# Patient Record
Sex: MALE | Race: WHITE | NOT HISPANIC OR LATINO | Employment: UNEMPLOYED | URBAN - METROPOLITAN AREA
[De-identification: names, ages, dates, MRNs, and addresses within clinical notes are randomized per-mention and may not be internally consistent; named-entity substitution may affect disease eponyms.]

---

## 2021-11-15 ENCOUNTER — HISTORICAL (OUTPATIENT)
Dept: ADMINISTRATIVE | Facility: HOSPITAL | Age: 41
End: 2021-11-15

## 2021-11-15 LAB
ABS NEUT (OLG): 5.46 X10(3)/MCL (ref 2.1–9.2)
ALBUMIN SERPL-MCNC: 3.3 GM/DL (ref 3.5–5)
ALBUMIN/GLOB SERPL: 0.5 RATIO (ref 1.1–2)
ALP SERPL-CCNC: 91 UNIT/L (ref 40–150)
ALT SERPL-CCNC: 28 UNIT/L (ref 0–55)
AMPHET UR QL SCN: NEGATIVE
APPEARANCE, UA: CLEAR
AST SERPL-CCNC: 63 UNIT/L (ref 5–34)
BACTERIA #/AREA URNS AUTO: ABNORMAL /HPF
BARBITURATE SCN PRESENT UR: NEGATIVE
BASOPHILS # BLD AUTO: 0.1 X10(3)/MCL (ref 0–0.2)
BASOPHILS NFR BLD AUTO: 1 %
BENZODIAZ UR QL SCN: NEGATIVE
BILIRUB SERPL-MCNC: 5.4 MG/DL
BILIRUB UR QL STRIP: NEGATIVE
BILIRUBIN DIRECT+TOT PNL SERPL-MCNC: 1.7 MG/DL (ref 0–0.8)
BILIRUBIN DIRECT+TOT PNL SERPL-MCNC: 3.7 MG/DL (ref 0–0.5)
BUN SERPL-MCNC: 14.9 MG/DL (ref 8.9–20.6)
CALCIUM SERPL-MCNC: 10.9 MG/DL (ref 8.7–10.5)
CANNABINOIDS UR QL SCN: NEGATIVE
CHLORIDE SERPL-SCNC: 101 MMOL/L (ref 98–107)
CO2 SERPL-SCNC: 22 MMOL/L (ref 22–29)
COCAINE UR QL SCN: NEGATIVE
COLOR UR: YELLOW
CREAT SERPL-MCNC: 1.07 MG/DL (ref 0.73–1.18)
EOSINOPHIL # BLD AUTO: 0.1 X10(3)/MCL (ref 0–0.9)
EOSINOPHIL NFR BLD AUTO: 2 %
ERYTHROCYTE [DISTWIDTH] IN BLOOD BY AUTOMATED COUNT: 15.3 % (ref 11.5–14.5)
ETHANOL SERPL-MCNC: <10 MG/DL
FENTANYL UR QL SCN: NEGATIVE
GLOBULIN SER-MCNC: 6.1 GM/DL (ref 2.4–3.5)
GLUCOSE (UA): NEGATIVE
GLUCOSE SERPL-MCNC: 108 MG/DL (ref 74–100)
HCT VFR BLD AUTO: 30.3 % (ref 40–51)
HGB BLD-MCNC: 10.4 GM/DL (ref 13.5–17.5)
HGB UR QL STRIP: NEGATIVE
HYALINE CASTS #/AREA URNS LPF: ABNORMAL /LPF
IMM GRANULOCYTES # BLD AUTO: 0.05 10*3/UL
IMM GRANULOCYTES NFR BLD AUTO: 1 %
INR PPP: 1.3 (ref 0.9–1.2)
KETONES UR QL STRIP: NEGATIVE
LEUKOCYTE ESTERASE UR QL STRIP: 25 LEU/UL
LYMPHOCYTES # BLD AUTO: 1.6 X10(3)/MCL (ref 0.6–4.6)
LYMPHOCYTES NFR BLD AUTO: 19 %
MCH RBC QN AUTO: 36.2 PG (ref 26–34)
MCHC RBC AUTO-ENTMCNC: 34.3 GM/DL (ref 31–37)
MCV RBC AUTO: 105.6 FL (ref 80–100)
MDMA UR QL SCN: NEGATIVE
MONOCYTES # BLD AUTO: 1.2 X10(3)/MCL (ref 0.1–1.3)
MONOCYTES NFR BLD AUTO: 14 %
NEUTROPHILS # BLD AUTO: 5.46 X10(3)/MCL (ref 2.1–9.2)
NEUTROPHILS NFR BLD AUTO: 64 %
NITRITE UR QL STRIP: NEGATIVE
NRBC BLD AUTO-RTO: 0 % (ref 0–0.2)
OPIATES UR QL SCN: NEGATIVE
PCP UR QL: NEGATIVE
PH UR STRIP.AUTO: 5.5 [PH] (ref 3–11)
PH UR STRIP: 5.5 [PH] (ref 4.5–8)
PLATELET # BLD AUTO: 199 X10(3)/MCL (ref 130–400)
PMV BLD AUTO: 10.6 FL (ref 7.4–10.4)
POTASSIUM SERPL-SCNC: 5.1 MMOL/L (ref 3.5–5.1)
PROT SERPL-MCNC: 9.4 GM/DL (ref 6.4–8.3)
PROT UR QL STRIP: NEGATIVE
PROTHROMBIN TIME: 16 SECOND(S) (ref 11.9–14.4)
RBC # BLD AUTO: 2.87 X10(6)/MCL (ref 4.5–5.9)
RBC #/AREA URNS AUTO: ABNORMAL /HPF
SODIUM SERPL-SCNC: 133 MMOL/L (ref 136–145)
SP GR UR STRIP: 1.01 (ref 1–1.03)
SQUAMOUS #/AREA URNS LPF: ABNORMAL /LPF
UROBILINOGEN UR STRIP-ACNC: NORMAL
WBC # SPEC AUTO: 8.5 X10(3)/MCL (ref 4.5–11)
WBC #/AREA URNS AUTO: ABNORMAL /HPF

## 2021-11-17 LAB — FINAL CULTURE: NO GROWTH

## 2022-03-28 ENCOUNTER — HISTORICAL (OUTPATIENT)
Dept: ENDOSCOPY | Facility: HOSPITAL | Age: 42
End: 2022-03-28

## 2022-04-10 ENCOUNTER — HISTORICAL (OUTPATIENT)
Dept: ADMINISTRATIVE | Facility: HOSPITAL | Age: 42
End: 2022-04-10
Payer: MEDICAID

## 2022-04-30 VITALS
DIASTOLIC BLOOD PRESSURE: 56 MMHG | SYSTOLIC BLOOD PRESSURE: 132 MMHG | WEIGHT: 156.75 LBS | BODY MASS INDEX: 22.44 KG/M2 | SYSTOLIC BLOOD PRESSURE: 145 MMHG | WEIGHT: 137.13 LBS | HEIGHT: 70 IN | BODY MASS INDEX: 19.63 KG/M2 | OXYGEN SATURATION: 99 % | DIASTOLIC BLOOD PRESSURE: 87 MMHG | HEIGHT: 70 IN

## 2022-05-14 NOTE — H&P
History of Present Illness  Patient presents today for screening EGD. He has been abstaining from alcohol. Denies chest pain, hematemesis, weight loss, dysphagia.  Review of Systems  Negative except as above  Physical Exam  Gen: well appearing, NAD  HEENT: normocephalic, atraumatic  CV: RRR  Pulm: no increased work of breathing, equal chest rise  Abd: soft, NT/ND  MSK: no diffuse rash, no injury or deformity  Neuro: no focal deficits, normal gait  Assessment/Plan  ?Dex Ma?is a 42 yo with?portal hypertension/decompensated liver disease dx secondary to alcohol abuse?10/2021 (original Meld 29, CPC-C, SAG 1.5, total protein <0.8 indicative of portal hypertension)? presenting today for screening EGD  ?  - risks and benefits of procedure discussed and consent obtained  - follow up pending findings  ?   Annabella Esteban PGY2   Problem List/Past Medical History  Ongoing  Alcoholic cirrhosis of liver with ascites  Historical  No qualifying data  Procedure/Surgical History  Drainage of Peritoneal Cavity, Percutaneous Approach (10/22/2021)  jaw surgery  tonsils and adenoids   Medications  Inpatient  No active inpatient medications  Home  folic acid 1 mg oral tablet, See Instructions, 3 refills  furosemide 20 mg oral tablet, See Instructions, 3 refills  lactulose 10 g/15 mL oral syrup, See Instructions  midodrine 10 mg oral tablet, 10 mg= 1 tab(s), Oral, TID, 2 refills  Protonix 40 mg ORAL enteric coated tablet, 40 mg= 1 tab(s), Oral, Daily, 3 refills  Rolling Walker, See Instructions,? ?Not taking: PT. STATES  spironolactone 25 mg oral tablet, See Instructions, 3 refills  Vitamin B1 100 mg oral tablet, See Instructions, 2 refills  Allergies  No Known Allergies  No Known Medication Allergies  Social History  Abuse/Neglect  No, No, Yes, 03/07/2022  Alcohol  Past, Liquor, 03/07/2022  Employment/School  Employed, 11/15/2021  Exercise  Exercise duration: 15. Exercise frequency: 1-2 times/week. Exercise type: EXERCISE  BIKE., 03/07/2022  Financial/Legal Situation  None, 03/07/2022  Home/Environment  Lives with Mother. Living situation: Home/Independent., 11/15/2021    Never in , 03/07/2022  Nutrition/Health  Low sodium, Good, 03/07/2022  Sexual  Gender Identity Identifies as male., 03/07/2022  Spiritual/Cultural  Orthodox, 11/15/2021  Substance Use  Never, 11/15/2021  Tobacco  5-9 cigarettes (between 1/4 to 1/2 pack)/day in last 30 days, Cigarettes, No, 03/07/2022     [1]?[facility] IM Office Visit Note; Lionel Reno MD 03/07/2022 10:18 CST

## 2022-08-03 ENCOUNTER — OFFICE VISIT (OUTPATIENT)
Dept: URGENT CARE | Facility: CLINIC | Age: 42
End: 2022-08-03
Payer: MEDICAID

## 2022-08-03 VITALS
TEMPERATURE: 99 F | DIASTOLIC BLOOD PRESSURE: 105 MMHG | RESPIRATION RATE: 20 BRPM | WEIGHT: 164.44 LBS | BODY MASS INDEX: 24.36 KG/M2 | HEART RATE: 74 BPM | OXYGEN SATURATION: 99 % | HEIGHT: 69 IN | SYSTOLIC BLOOD PRESSURE: 162 MMHG

## 2022-08-03 DIAGNOSIS — I10 HYPERTENSION, UNSPECIFIED TYPE: ICD-10-CM

## 2022-08-03 DIAGNOSIS — R10.9 ABDOMINAL PAIN, UNSPECIFIED ABDOMINAL LOCATION: Primary | ICD-10-CM

## 2022-08-03 LAB
ALBUMIN SERPL-MCNC: 4.2 GM/DL (ref 3.5–5)
ALBUMIN/GLOB SERPL: 1.1 RATIO (ref 1.1–2)
ALP SERPL-CCNC: 82 UNIT/L (ref 40–150)
ALT SERPL-CCNC: 16 UNIT/L (ref 0–55)
AMYLASE SERPL-CCNC: 81 UNIT/L (ref 25–125)
AST SERPL-CCNC: 20 UNIT/L (ref 5–34)
BASOPHILS # BLD AUTO: 0.03 X10(3)/MCL (ref 0–0.2)
BASOPHILS NFR BLD AUTO: 0.5 %
BILIRUB UR QL STRIP: NEGATIVE
BILIRUBIN DIRECT+TOT PNL SERPL-MCNC: 1.2 MG/DL
BUN SERPL-MCNC: 14.5 MG/DL (ref 8.9–20.6)
CALCIUM SERPL-MCNC: 10 MG/DL (ref 8.4–10.2)
CHLORIDE SERPL-SCNC: 100 MMOL/L (ref 98–107)
CO2 SERPL-SCNC: 30 MMOL/L (ref 22–29)
CREAT SERPL-MCNC: 0.92 MG/DL (ref 0.73–1.18)
EOSINOPHIL # BLD AUTO: 0.17 X10(3)/MCL (ref 0–0.9)
EOSINOPHIL NFR BLD AUTO: 2.6 %
ERYTHROCYTE [DISTWIDTH] IN BLOOD BY AUTOMATED COUNT: 12.3 % (ref 11.5–17)
GLOBULIN SER-MCNC: 3.9 GM/DL (ref 2.4–3.5)
GLUCOSE SERPL-MCNC: 85 MG/DL (ref 74–100)
GLUCOSE UR QL STRIP: NEGATIVE
HAV AB SER QL IA: NONREACTIVE
HAV IGM SERPL QL IA: NONREACTIVE
HBV SURFACE AG SERPL QL IA: NONREACTIVE
HCT VFR BLD AUTO: 46.7 % (ref 42–52)
HCV AB SERPL QL IA: NONREACTIVE
HGB BLD-MCNC: 16.5 GM/DL (ref 14–18)
IMM GRANULOCYTES # BLD AUTO: 0.01 X10(3)/MCL (ref 0–0.04)
IMM GRANULOCYTES NFR BLD AUTO: 0.2 %
KETONES UR QL STRIP: POSITIVE
LEUKOCYTE ESTERASE UR QL STRIP: NEGATIVE
LIPASE SERPL-CCNC: 24 U/L
LYMPHOCYTES # BLD AUTO: 1.86 X10(3)/MCL (ref 0.6–4.6)
LYMPHOCYTES NFR BLD AUTO: 28.4 %
MCH RBC QN AUTO: 32.8 PG (ref 27–31)
MCHC RBC AUTO-ENTMCNC: 35.3 MG/DL (ref 33–36)
MCV RBC AUTO: 92.8 FL (ref 80–94)
MONOCYTES # BLD AUTO: 0.44 X10(3)/MCL (ref 0.1–1.3)
MONOCYTES NFR BLD AUTO: 6.7 %
NEUTROPHILS # BLD AUTO: 4.1 X10(3)/MCL (ref 2.1–9.2)
NEUTROPHILS NFR BLD AUTO: 61.6 %
NRBC BLD AUTO-RTO: 0 %
PH, POC UA: 6
PLATELET # BLD AUTO: 156 X10(3)/MCL (ref 130–400)
PMV BLD AUTO: 11.7 FL (ref 7.4–10.4)
POC BLOOD, URINE: NEGATIVE
POC NITRATES, URINE: NEGATIVE
POTASSIUM SERPL-SCNC: 3.9 MMOL/L (ref 3.5–5.1)
PROT SERPL-MCNC: 8.1 GM/DL (ref 6.4–8.3)
PROT UR QL STRIP: NEGATIVE
RBC # BLD AUTO: 5.03 X10(6)/MCL (ref 4.7–6.1)
SODIUM SERPL-SCNC: 137 MMOL/L (ref 136–145)
SP GR UR STRIP: 1.02 (ref 1–1.03)
UROBILINOGEN UR STRIP-ACNC: 1 (ref 0.3–2.2)
WBC # SPEC AUTO: 6.6 X10(3)/MCL (ref 4.5–11.5)

## 2022-08-03 PROCEDURE — 86803 HEPATITIS C AB TEST: CPT | Performed by: NURSE PRACTITIONER

## 2022-08-03 PROCEDURE — 86709 HEPATITIS A IGM ANTIBODY: CPT | Performed by: NURSE PRACTITIONER

## 2022-08-03 PROCEDURE — 36415 COLL VENOUS BLD VENIPUNCTURE: CPT | Performed by: NURSE PRACTITIONER

## 2022-08-03 PROCEDURE — 81003 URINALYSIS AUTO W/O SCOPE: CPT | Mod: PBBFAC | Performed by: NURSE PRACTITIONER

## 2022-08-03 PROCEDURE — 99214 PR OFFICE/OUTPT VISIT, EST, LEVL IV, 30-39 MIN: ICD-10-PCS | Mod: S$PBB,,, | Performed by: NURSE PRACTITIONER

## 2022-08-03 PROCEDURE — 82150 ASSAY OF AMYLASE: CPT | Performed by: NURSE PRACTITIONER

## 2022-08-03 PROCEDURE — 99214 OFFICE O/P EST MOD 30 MIN: CPT | Mod: S$PBB,,, | Performed by: NURSE PRACTITIONER

## 2022-08-03 PROCEDURE — 87340 HEPATITIS B SURFACE AG IA: CPT | Performed by: NURSE PRACTITIONER

## 2022-08-03 PROCEDURE — 85025 COMPLETE CBC W/AUTO DIFF WBC: CPT | Performed by: NURSE PRACTITIONER

## 2022-08-03 PROCEDURE — 80053 COMPREHEN METABOLIC PANEL: CPT | Performed by: NURSE PRACTITIONER

## 2022-08-03 PROCEDURE — 99215 OFFICE O/P EST HI 40 MIN: CPT | Mod: PBBFAC | Performed by: NURSE PRACTITIONER

## 2022-08-03 PROCEDURE — 83690 ASSAY OF LIPASE: CPT | Performed by: NURSE PRACTITIONER

## 2022-08-03 PROCEDURE — 86708 HEPATITIS A ANTIBODY: CPT | Performed by: NURSE PRACTITIONER

## 2022-08-03 RX ORDER — LACTULOSE 10 G/15ML
10 SOLUTION ORAL; RECTAL
COMMUNITY
Start: 2022-02-02 | End: 2022-08-15 | Stop reason: SDUPTHER

## 2022-08-03 RX ORDER — FUROSEMIDE 20 MG/1
TABLET ORAL
COMMUNITY
Start: 2022-02-02

## 2022-08-03 RX ORDER — SPIRONOLACTONE 25 MG/1
TABLET ORAL
COMMUNITY
Start: 2022-01-24

## 2022-08-03 RX ORDER — FOLIC ACID 1 MG/1
TABLET ORAL
COMMUNITY
Start: 2022-02-02 | End: 2022-08-15 | Stop reason: SDUPTHER

## 2022-08-03 RX ORDER — PANTOPRAZOLE SODIUM 40 MG/1
40 TABLET, DELAYED RELEASE ORAL
COMMUNITY
Start: 2022-01-21

## 2022-08-03 RX ORDER — THIAMINE HCL 100 MG
100 TABLET ORAL DAILY
COMMUNITY
Start: 2022-05-31 | End: 2022-08-15 | Stop reason: SDUPTHER

## 2022-08-03 NOTE — PROGRESS NOTES
"Subjective:       Patient ID: Dex Ma is a 42 y.o. male.    Vitals:  height is 5' 8.9" (1.75 m) and weight is 74.6 kg (164 lb 7.4 oz). His temperature is 98.8 °F (37.1 °C). His blood pressure is 162/105 (abnormal) and his pulse is 74. His respiration is 20 and oxygen saturation is 99%.     Chief Complaint: Abdominal Pain (Rt lower x 4days)    HPI hx alcoholism. Was hospitalized in October of 2021 due to alcohol abuse. Is seeing Dr. AASHISH Tripathi for GI and also Urology Clinic. Has not been diagnosed with either hepatitis, cirrhosis, liver cancer, etc, is awaiting work up. States since being out of the hospital has had no abdominal pain. Now, for 4 days, has been having right middle quadrant pain. No other symptoms. No change in stool. No fevers. No jaundice. No vomiting. Has not consumed alcohol, is avoiding tylenol products. Had been taken off his diurectics, then put back on, so relates increased urination to that.     ROS    Objective:      Physical Exam   Constitutional: He is oriented to person, place, and time. normal  HENT:   Nose: No rhinorrhea or congestion.   Eyes: Conjunctivae are normal. Pupils are equal, round, and reactive to light. Extraocular movement intact   Pulmonary/Chest: Effort normal and breath sounds normal.   Abdominal: Normal appearance and bowel sounds are normal. He exhibits no distension, no fluid wave and no mass. Soft. flat abdomen There is hepatomegaly. There is no splenomegaly. There is abdominal tenderness. There is no rebound, no guarding, no tenderness at McBurney's point, negative Marshall's sign and negative Rovsing's sign. No hernia.   Musculoskeletal: Normal range of motion.         General: Normal range of motion.   Neurological: He is alert and oriented to person, place, and time.   Skin: Skin is warm and dry.   Psychiatric: His behavior is normal. Mood, judgment and thought content normal.   Vitals reviewed.        Assessment:       1. Abdominal pain, unspecified " abdominal location    2. Hypertension, unspecified type        Results for orders placed or performed in visit on 08/03/22   POCT Urinalysis, Dipstick, Automated, W/O Scope   Result Value Ref Range    POC Blood, Urine Negative Negative    POC Bilirubin, Urine Negative Negative    POC Urobilinogen, Urine 1.0 0.3 - 2.2    POC Ketones, Urine Positive (A) Negative    POC Protein, Urine Negative Negative    POC Nitrates, Urine Negative Negative    POC Glucose, Urine Negative Negative    pH, UA 6.0     POC Specific Gravity, Urine 1.020 1.003 - 1.029    POC Leukocytes, Urine Negative Negative       Plan:         Abdominal pain, unspecified abdominal location  -     POCT Urinalysis, Dipstick, Automated, W/O Scope  -     CBC Auto Differential  -     Comprehensive Metabolic Panel  -     Amylase  -     Lipase  -     Hepatitis B Surface Antigen  -     Hepatitis A Antibody, IgM  -     Hepatitis A antibody, IgG  -     Hepatitis C Antibody    Hypertension, unspecified type  -     Nursing communication        Please see patient education for more information.  Please check your [facility] patient portal nery for your blood test results.  If anything is Emergent/Urgent - we will notify you today.  We will call and/or message you within the portal nery, with comments and plans of care if necessary.    Go to the emergency room if you experience severe pain, yellow skin or eyes, vomiting, fevers, blood in stool, confusion.

## 2022-08-03 NOTE — PATIENT INSTRUCTIONS
Please see patient education for more information.  Please check your [facility] patient portal nery for your blood test results.  If anything is Emergent/Urgent - we will notify you today.  We will call and/or message you within the portal nery, with comments and plans of care if necessary.    Go to the emergency room if you experience severe pain, yellow skin or eyes, vomiting, fevers, blood in stool, confusion.

## 2022-08-15 ENCOUNTER — OFFICE VISIT (OUTPATIENT)
Dept: GASTROENTEROLOGY | Facility: CLINIC | Age: 42
End: 2022-08-15
Payer: MEDICAID

## 2022-08-15 VITALS
WEIGHT: 164.38 LBS | SYSTOLIC BLOOD PRESSURE: 128 MMHG | TEMPERATURE: 99 F | HEART RATE: 74 BPM | HEIGHT: 69 IN | RESPIRATION RATE: 20 BRPM | DIASTOLIC BLOOD PRESSURE: 85 MMHG | BODY MASS INDEX: 24.35 KG/M2

## 2022-08-15 DIAGNOSIS — K70.30 ALCOHOLIC CIRRHOSIS OF LIVER WITHOUT ASCITES: Primary | ICD-10-CM

## 2022-08-15 PROCEDURE — 99213 OFFICE O/P EST LOW 20 MIN: CPT | Mod: PBBFAC | Performed by: STUDENT IN AN ORGANIZED HEALTH CARE EDUCATION/TRAINING PROGRAM

## 2022-08-15 RX ORDER — THIAMINE HCL 100 MG
100 TABLET ORAL DAILY
Qty: 90 TABLET | Refills: 3 | Status: SHIPPED | OUTPATIENT
Start: 2022-08-15 | End: 2022-09-21 | Stop reason: SDUPTHER

## 2022-08-15 RX ORDER — LACTULOSE 10 G/15ML
10 SOLUTION ORAL; RECTAL 2 TIMES DAILY
Qty: 900 ML | Refills: 8 | Status: SHIPPED | OUTPATIENT
Start: 2022-08-15 | End: 2023-05-06

## 2022-08-15 RX ORDER — FOLIC ACID 1 MG/1
TABLET ORAL
Qty: 90 TABLET | Refills: 3 | Status: SHIPPED | OUTPATIENT
Start: 2022-08-15 | End: 2023-03-09 | Stop reason: SDUPTHER

## 2022-08-15 NOTE — PROGRESS NOTES
Subjective:       Patient ID: Dex Ma is a 42 y.o. male.    Chief Complaint: Cirrhosis    42-year-old male history of alcoholic cirrhosis, previously hospitalized here at our facility 2021, presenting to clinic today for his follow-up appointment.  He was hospitalized in October 2021 with ascites and decompensated cirrhotic liver disease.  Had presented with weakness and abdominal distension have been going on for weeks prior to his admission with poor p.o. intake and decreased appetite.  Previously used to drink 2 pt of liquor a day up until a few months prior to this prior admission when he had decreased his intake.  At prior to his admission was drinking half a pt to 1 pt of liquor a day.  Smokes cigarettes.  Prior cocaine and marijuana use.  But has not used drugs in if years.      Gastroenterology evaluated patient at that time as MELD was 29, Child Vu class C.  Required a paracentesis on that admission that showed SAAG 1.5, total protein less than 0.8 suggesting  portal hypertension.  Of hyponatremic on admission and diuretics were held at that time and required albumin after his paracentesis.  He was placed on Bactrim once daily for SBP prophylaxis.  EGD was not done inpatient.      Patient had been seen by the residents outpatient after discharge his MELD was already improving total bilirubin had decreased from 18 on the hospital admission down to 5.4.  MELD and a clinic visit calculated to be 21 and Child Vu score was down to be.  Had remained absent from alcohol that time.  Reduced swelling distension along with decreased swelling in lower extremities.      Patient seen today, his recent MELD is 5, Child Vu class a of labs done in March 2022.  His EGD was also performed in March 2022 that showed a normal esophagus, stomach and duodenum.  He still needs an updated abdominal ultrasound.  His last imaging performed was a CT of the abdomen done October 2021 that showed stigmata of cirrhosis  with portal hypertension diffuse body wall edema and bilateral pleural effusions as well.  He needs refills on his folate, B12, lactulose.    He denies any melena, hematochezia, hematemesis.  Denies any jaundice or abdominal swelling or lower extremity swelling.    Review of Systems   Constitutional: Negative.    HENT: Negative.    Eyes: Negative.    Respiratory: Negative.    Cardiovascular: Negative.    Gastrointestinal: Positive for abdominal pain.   Endocrine: Negative.    Musculoskeletal: Negative.    Integumentary:  Negative.   Allergic/Immunologic: Negative.    Neurological: Negative.    Hematological: Negative.    Psychiatric/Behavioral: Negative.          Objective:  Vitals:    08/15/22 1522   BP: 128/85   Pulse: 74   Resp: 20   Temp: 98.5 °F (36.9 °C)           Physical Exam  Constitutional:       Appearance: He is normal weight.   HENT:      Head: Normocephalic and atraumatic.      Mouth/Throat:      Mouth: Mucous membranes are moist.      Pharynx: Oropharynx is clear.   Eyes:      Extraocular Movements: Extraocular movements intact.      Conjunctiva/sclera: Conjunctivae normal.      Pupils: Pupils are equal, round, and reactive to light.   Cardiovascular:      Rate and Rhythm: Normal rate and regular rhythm.      Pulses: Normal pulses.      Heart sounds: Normal heart sounds.   Pulmonary:      Effort: Pulmonary effort is normal.      Breath sounds: Normal breath sounds.   Abdominal:      General: Abdomen is flat. Bowel sounds are normal.      Palpations: Abdomen is soft.   Musculoskeletal:         General: Normal range of motion.   Skin:     General: Skin is warm and dry.   Neurological:      General: No focal deficit present.      Mental Status: He is alert and oriented to person, place, and time. Mental status is at baseline.   Psychiatric:         Mood and Affect: Mood normal.         Thought Content: Thought content normal.         Judgment: Judgment normal.         Assessment:       Problem List Items  Addressed This Visit    None     Visit Diagnoses     Alcoholic cirrhosis of liver without ascites    -  Primary    Relevant Medications    folic acid (FOLVITE) 1 MG tablet    Other Relevant Orders    US Abdomen Limited_Liver          Plan:         Background:  Alcohol:  Yes, the past    Tobacco:  Cigarettes    Liver disease:  Alcohol    MELD-Na:  5  Child-Vu Class: A    Transplant: not under evaluation, low MELD    Cirrhosis is decompensated with:    Ascites:  In the past yes, in his October 2021 admission went been on Lasix 40 mg Aldactone 100 mg previously; paracentesis performed October 2021 revealed SANDRO G1 0.5, total protein less than 0.8, suggestive of portal hypertension.  Spontaneous bacterial peritonitis: No  Hepatic Encephalopathy: No  Hepatocellular carcinoma: No  Hepatorenal syndrome: No  Hyponatremia: No  Muscle wasting: No  Portal vein thrombosis: No    Screening:  Last EGD:  March 2022:  Normal esophagus, stomach, duodenum.      Last imaging study:  Abd CT:  October 2021:  Showed stigmata of cirrhosis with portal hypertension diffuse body wall edema and bilateral pleural effusions as well.    CIRRHOSIS COUNSELING:  - strict abstinence of alcohol use  - low sodium (salt) 2000mg per day  - Nutrition: 25-30kcal (calorie per body weight in kilogram) per day  - No need to restrict protein in diet  - High protein diet: 1.2-1.5 gram/kg (protein per body weight in kg) per day to prevent muscle mass loss  - Resistance exercises for muscle strength  - Avoid raw seafoods due to risk of fatal Vibrio vulnificus infection  - Avoid Non-steroidal anti-inflammatory drugs (NSAIDs) such as ibuprofen, Motrin, naproxen, Aleve due to risk of kidney damage  - Can take acetaminophen (tylenol), no more than 2000mg per day  - Compliance with all medications  - Ultrasound or MRI of the liver every 6 months for liver cancer screening  - Upper endoscopy every 1-2 years to screen for varices      Follow-up in 6 months, will  repeat abdominal ultrasound for HCC screening at this time.    Praised abstinence from alcohol.  Next EGD to be done in 2024.  He will need a colonoscopy when he is 44 years old.

## 2022-08-23 ENCOUNTER — HOSPITAL ENCOUNTER (OUTPATIENT)
Dept: RADIOLOGY | Facility: HOSPITAL | Age: 42
Discharge: HOME | End: 2022-08-23
Attending: STUDENT IN AN ORGANIZED HEALTH CARE EDUCATION/TRAINING PROGRAM
Payer: MEDICAID

## 2022-08-23 DIAGNOSIS — K70.30 ALCOHOLIC CIRRHOSIS OF LIVER WITHOUT ASCITES: ICD-10-CM

## 2022-08-23 PROCEDURE — 76705 ECHO EXAM OF ABDOMEN: CPT | Mod: TC

## 2022-09-21 ENCOUNTER — OFFICE VISIT (OUTPATIENT)
Dept: INTERNAL MEDICINE | Facility: CLINIC | Age: 42
End: 2022-09-21
Payer: MEDICAID

## 2022-09-21 VITALS
BODY MASS INDEX: 24.41 KG/M2 | DIASTOLIC BLOOD PRESSURE: 87 MMHG | RESPIRATION RATE: 20 BRPM | OXYGEN SATURATION: 98 % | HEART RATE: 97 BPM | HEIGHT: 69 IN | TEMPERATURE: 98 F | SYSTOLIC BLOOD PRESSURE: 133 MMHG | WEIGHT: 164.81 LBS

## 2022-09-21 DIAGNOSIS — F10.21 HISTORY OF ALCOHOLISM: Primary | ICD-10-CM

## 2022-09-21 PROCEDURE — 99214 OFFICE O/P EST MOD 30 MIN: CPT | Mod: PBBFAC

## 2022-09-21 RX ORDER — THIAMINE HCL 100 MG
100 TABLET ORAL DAILY
Qty: 90 TABLET | Refills: 3 | Status: SHIPPED | OUTPATIENT
Start: 2022-09-21 | End: 2023-03-09 | Stop reason: SDUPTHER

## 2022-09-21 NOTE — PROGRESS NOTES
[facility]    CC: Routine Follow-up    HPI   Mr. Dex Ma is a 42-year-old  male with a past medical history of a alcohol use disorder, portal hypertension and decompensated liver disease presenting for routine follow-up.  Continues to abstain from alcohol since discharge from hospital in 2021.  Reports good appetite, titrating lactulose to 2-3 bowel movements per day.  Compliant min thiamine, folic acid.  Recently saw Dr. Hernandez and cirrhosis clinic 2 months ago underwent right upper quadrant ultrasound 08/23/2022.  Discussed results, no significant changes from prior, no new focal lesions.  Otherwise reports he is doing well, continues to live with parents.  He works at a [employer] facility and was requesting Adderall for help with concentration.  Reports easy distractibility, unable to focus, reports this has been throughout his lifetime.  At some point on college he used Adderall to help him focus and has not really used since then.  Denies being diagnosed in childhood or being on Adderall prescriptions.  Discuss referral to [facility] psychiatrist for further evaluation and potentially starting Wellbutrin as an option.    Review of Systems  14 pt ROS negative unless mentioned above    Physical Examination  /69 (BP Location: Left arm, Patient Position: Sitting)   Pulse (!) 54   Temp 97.9 °F (36.6 °C) (Oral)   Resp 18   Ht 6' (1.829 m)   Wt 85.7 kg (189 lb)   SpO2 99%   BMI 25.63 kg/m²   General appearance: alert, cooperative, no distress  HENT: Normocephalic, atraumatic, neck symmetrical, no nasal discharge, No scleral icterus  Lungs: clear to auscultation bilaterally, no dullness to percussion bilaterally  Heart: regular rate and rhythm without rub; no displacement of the PMI   Abdomen: soft, non-tender; bowel sounds normoactive; no organomegaly  Extremities: extremities symmetric; no clubbing, cyanosis, or edema  Neurologic: Alert and oriented X 3, normal strength,  normal coordination and gait  Psych: slightly fast pace of speaking, comprehensible, no flight of idea, does seem slightly easily distractible    Assessment/Plan:    Hx of decompensated cirrhotic liver disease 2/2 chronic alcohol use  -d at hospital admission 10/2021 (original Meld 29, CPC-C, SAG 1.5, total protein <0.8 indicative of portal hypertension )  -currently abstaining  -Compliant with cirrhosis clinic and GI f/u. Next appt Cirrhosis clinic 02/2023  -RUQ q6m ( 8/23/2022 without new focal lesions, similar compared to prior heterogenous liver with hep year atosteatosis without ascites), Hep C neg  -EGD by Dr. Lomas 03/28/2022, will need to look at official report in [facility] ( normal esophagus, stomach, duodenum per SHANTE Farr Note 08/15/2022)    Decreased concentration  -Referred to Dr. Blanco to evaluate for ADHD    Current Tobacco User  -10 cigs/day, advised cessation and and offered referral to cessation counseling not ready yet and and blood    HM  -refused Flu Vaccine/Tetanus ( no specific reason given)    F/U in 6 months, with labs ( CBC, CMP, INR, EtOH level)    Lionel Reno MD  PGY-3, Internal Medicine Resident

## 2022-11-09 ENCOUNTER — OFFICE VISIT (OUTPATIENT)
Dept: BEHAVIORAL HEALTH | Facility: CLINIC | Age: 42
End: 2022-11-09
Payer: MEDICAID

## 2022-11-09 VITALS
TEMPERATURE: 98 F | SYSTOLIC BLOOD PRESSURE: 145 MMHG | WEIGHT: 163.31 LBS | OXYGEN SATURATION: 99 % | BODY MASS INDEX: 24.12 KG/M2 | HEART RATE: 64 BPM | DIASTOLIC BLOOD PRESSURE: 93 MMHG

## 2022-11-09 DIAGNOSIS — F10.21 ALCOHOL USE DISORDER, MODERATE, IN SUSTAINED REMISSION: ICD-10-CM

## 2022-11-09 DIAGNOSIS — F90.0 ADHD (ATTENTION DEFICIT HYPERACTIVITY DISORDER), INATTENTIVE TYPE: Primary | ICD-10-CM

## 2022-11-09 LAB
T4 FREE SERPL-MCNC: 0.88 NG/DL (ref 0.7–1.48)
TSH SERPL-ACNC: 1.94 UIU/ML (ref 0.35–4.94)

## 2022-11-09 PROCEDURE — 3077F SYST BP >= 140 MM HG: CPT | Mod: CPTII,AF,HB, | Performed by: STUDENT IN AN ORGANIZED HEALTH CARE EDUCATION/TRAINING PROGRAM

## 2022-11-09 PROCEDURE — 3080F DIAST BP >= 90 MM HG: CPT | Mod: CPTII,AF,HB, | Performed by: STUDENT IN AN ORGANIZED HEALTH CARE EDUCATION/TRAINING PROGRAM

## 2022-11-09 PROCEDURE — 1159F MED LIST DOCD IN RCRD: CPT | Mod: CPTII,AF,HB, | Performed by: STUDENT IN AN ORGANIZED HEALTH CARE EDUCATION/TRAINING PROGRAM

## 2022-11-09 PROCEDURE — 3008F BODY MASS INDEX DOCD: CPT | Mod: CPTII,AF,HB, | Performed by: STUDENT IN AN ORGANIZED HEALTH CARE EDUCATION/TRAINING PROGRAM

## 2022-11-09 PROCEDURE — 3008F PR BODY MASS INDEX (BMI) DOCUMENTED: ICD-10-PCS | Mod: CPTII,AF,HB, | Performed by: STUDENT IN AN ORGANIZED HEALTH CARE EDUCATION/TRAINING PROGRAM

## 2022-11-09 PROCEDURE — 99204 OFFICE O/P NEW MOD 45 MIN: CPT | Mod: S$PBB,AF,HB, | Performed by: STUDENT IN AN ORGANIZED HEALTH CARE EDUCATION/TRAINING PROGRAM

## 2022-11-09 PROCEDURE — 3080F PR MOST RECENT DIASTOLIC BLOOD PRESSURE >= 90 MM HG: ICD-10-PCS | Mod: CPTII,AF,HB, | Performed by: STUDENT IN AN ORGANIZED HEALTH CARE EDUCATION/TRAINING PROGRAM

## 2022-11-09 PROCEDURE — 84443 ASSAY THYROID STIM HORMONE: CPT | Performed by: STUDENT IN AN ORGANIZED HEALTH CARE EDUCATION/TRAINING PROGRAM

## 2022-11-09 PROCEDURE — 84439 ASSAY OF FREE THYROXINE: CPT | Performed by: STUDENT IN AN ORGANIZED HEALTH CARE EDUCATION/TRAINING PROGRAM

## 2022-11-09 PROCEDURE — 1160F PR REVIEW ALL MEDS BY PRESCRIBER/CLIN PHARMACIST DOCUMENTED: ICD-10-PCS | Mod: CPTII,AF,HB, | Performed by: STUDENT IN AN ORGANIZED HEALTH CARE EDUCATION/TRAINING PROGRAM

## 2022-11-09 PROCEDURE — 3077F PR MOST RECENT SYSTOLIC BLOOD PRESSURE >= 140 MM HG: ICD-10-PCS | Mod: CPTII,AF,HB, | Performed by: STUDENT IN AN ORGANIZED HEALTH CARE EDUCATION/TRAINING PROGRAM

## 2022-11-09 PROCEDURE — 36415 COLL VENOUS BLD VENIPUNCTURE: CPT | Performed by: STUDENT IN AN ORGANIZED HEALTH CARE EDUCATION/TRAINING PROGRAM

## 2022-11-09 PROCEDURE — 1159F PR MEDICATION LIST DOCUMENTED IN MEDICAL RECORD: ICD-10-PCS | Mod: CPTII,AF,HB, | Performed by: STUDENT IN AN ORGANIZED HEALTH CARE EDUCATION/TRAINING PROGRAM

## 2022-11-09 PROCEDURE — 99204 PR OFFICE/OUTPT VISIT, NEW, LEVL IV, 45-59 MIN: ICD-10-PCS | Mod: S$PBB,AF,HB, | Performed by: STUDENT IN AN ORGANIZED HEALTH CARE EDUCATION/TRAINING PROGRAM

## 2022-11-09 PROCEDURE — 99213 OFFICE O/P EST LOW 20 MIN: CPT | Mod: PBBFAC,PN | Performed by: STUDENT IN AN ORGANIZED HEALTH CARE EDUCATION/TRAINING PROGRAM

## 2022-11-09 PROCEDURE — 80307 DRUG TEST PRSMV CHEM ANLYZR: CPT | Performed by: STUDENT IN AN ORGANIZED HEALTH CARE EDUCATION/TRAINING PROGRAM

## 2022-11-09 PROCEDURE — 1160F RVW MEDS BY RX/DR IN RCRD: CPT | Mod: CPTII,AF,HB, | Performed by: STUDENT IN AN ORGANIZED HEALTH CARE EDUCATION/TRAINING PROGRAM

## 2022-11-09 RX ORDER — BUPROPION HYDROCHLORIDE 150 MG/1
150 TABLET ORAL DAILY
Qty: 30 TABLET | Refills: 2 | Status: SHIPPED | OUTPATIENT
Start: 2022-11-09 | End: 2023-11-09

## 2022-11-09 NOTE — PROGRESS NOTES
"Outpatient Psychiatry Initial Visit    11/9/2022    Dex Ma, a 42 y.o. male, presenting for initial evaluation visit. Met with patient.    Reason for Encounter:   Referred from: Lionel Reno MD  Reason for referral: "history of alcoholism"  Chief complaint: "I wanted to get on Adderall"    History of Present Illness:   Pt is a 41yo M w/ PPHx of anxiety  who presents to psychiatry clinic for evaluation.      Pt reports history of mental health treatment as a teenager.  Denies history of psychiatric diagnosis.  Notes he was prescribed paxil (one month), helpful but did not continue to take.  Denies history of ADHD evaluation or diagnosis.  Tried adderall from friends in college, helpful without SE.  Notes difficulty with focusing since childhood.  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  In school, had a hard time remaining seated, remaining focused, studying.  Made "decent grades in school," didn't finish HS, completed GED.  Often in skilled nursing ("out of boredom"), in school suspensions.  Denies expulsions.  Denies fighting in school.  Notes similar problems at home with attention.  Didn't finish higher education program.  Pt voices interest in evaluation now due to upcoming job change.      Regarding depression, pt denies history of depressive episodes.  Denies currently feeling depressed.  Denies history of suicidal thinking or behavior.      Denies history of episodes concerning for evangelist/hypomania.      Denies history of hallucinations or other altered perceptions, denies paranoid ideation.      Endorses history of excess worry/anxiety.  Anxiety much improved with stopping drinking.  Endorses growing up with excessive anxiety.  Denies current difficulty with anxiety.     Denies history of significant traumatic events.   Denies post traumatic symptoms.    Started drinking as a teenager.  Worked in [employer] business for about 20 yrs.  Notes drinking " "became problematic about 10 yrs ago.  Notes "I was drinking too much."  Drinking about 1/5 daily.  Notes withdrawal symptoms when stopping drinking "towards the end."  Notes that he stopped drinking "because me liver was really damaged."  Hospitalized at [facility].  Last drink 10/21/2022.  Denies rehab/IOP.  Does not use 12 step meetings as supports.  Denies cravings for alcohol.  Diagnosed with cirrhosis and portal hypertension.  Currently seeing liver doctor, no current need for liver transplant.      Meds Hx (has pt taken the following):   SSRIs: paxil (helpful, SE of "feeling like a zombie")  SNRIs: denies  TCAs: denies  Atypical ADs: denies  Anxiolytics: denies  Neuroleptics: denies  Mood stabilizers: denies  Stimulants: adderall (non prescribed, helpful, no SE)  Other: denies    History:     Allergies:  Patient has no known allergies.    Past Medical/Surgical History:  Past Medical History:   Diagnosis Date    GERD (gastroesophageal reflux disease)      Past Surgical History:   Procedure Laterality Date    ADENOIDECTOMY      DENTAL SURGERY      TONSILLECTOMY         Medications  Outpatient Encounter Medications as of 11/9/2022   Medication Sig Dispense Refill    folic acid (FOLVITE) 1 MG tablet See Instructions, TAKE 1 TABLET BY MOUTH DAILY, # 30 tab(s), 3 Refill(s), Pharmacy: [id number], 177, cm, Height/Length Dosing, 01/24/22 15:49:00 CST, 71.1, kg, Weight Dosing, 01/24/22 15:49:00 CST 90 tablet 3    lactulose (CHRONULAC) 10 gram/15 mL solution Take 15 mLs (10 g total) by mouth 2 (two) times daily. 900 mL 8    pantoprazole (PROTONIX) 40 MG tablet Take 40 mg by mouth.      VITAMIN B-1 100 MG tablet Take 1 tablet (100 mg total) by mouth once daily. 90 tablet 3    buPROPion (WELLBUTRIN XL) 150 MG TB24 tablet Take 1 tablet (150 mg total) by mouth once daily. 30 tablet 2    furosemide (LASIX) 20 MG tablet   See Instructions, TAKE 2 TABLETS BY MOUTH DAILY, # 60 tab(s), 3 Refill(s), Pharmacy: [facility] " [facility] #[id number], 177, cm, Height/Length Dosing, 01/24/22 15:49:00 CST, 71.1, kg, Weight Dosing, 01/24/22 15:49:00 CST      spironolactone (ALDACTONE) 25 MG tablet   See Instructions, TAKE 4 TABLETS BY MOUTH DAILY, # 120 tab(s), 3 Refill(s), Pharmacy: [facility] #[id number], 177, cm, Height/Length Dosing, 01/24/22 15:49:00 CST, 71.1, kg, Weight Dosing, 01/24/22 15:49:00 CST       No facility-administered encounter medications on file as of 11/9/2022.     Past Psychiatric History:  Previous Medication Trials: See above   Previous Psychiatric Hospitalizations: denies   Previous Suicide Attempts: denies   History of Violence: none in past 6 months  Outpatient mental health: denies  Family History: denies    Social History:  Marital Status: single  Children: 0   Employment Status/Info: working on custom cars  Education: completed GED  Housing Status: house with parents and brother  History of phys/sexual abuse: denies  Access to gun: yes, not stored in secure location, unloaded, ammunition kept with firearm    Substance Abuse History:  Tobacco Use: 0.5ppd, started "late teens," mild interest in quitting  Use of Alcohol: see above  Recreational Drugs: cannabis, 3-4x per week  Rehab/detox: denies    Legal History:  Past Charges/Incarcerations: arrest for disordly conduct   Pending charges: denies     Psychosocial Stressors: health and occupational    Review Of Systems:     Constitutional: denies fevers, denies chills, denies recent weight change  Eyes: denies pain in eyes or loss of vision  Ears: denies tinnitis, denies loss of hearing  Mouth/throat: denies difficulty with speaking, denies difficulty with swallowing  Cardiac: denies CP, denies palpitations  Respiratory: denies SOB, denies cough  Gastrointestinal: denies abdominal pain, denies nausea/vomiting, denies constipation/diarrhea  Genitourinary: denies urinary frequency, denies burning on urination  Dermatologic: denies rash, denies " "erythema  Musculoskeletal: denies myalgias, denies arthralgias  Hematologic: denies easy bleeding/bruising, denies enlarged lymph nodes  Neurologic: denies seizures, denies headaches, denies loss of sensation, denies weakness  Psychiatric: see HPI    Current Evaluation:     Nutritional Screening: Considering the patient's height and weight, medications, medical history and preferences, should a referral be made to the dietitian? no    Constitutional  Vitals:  Most recent vital signs, dated less than 90 days prior to this appointment, were reviewed.      Vitals:    11/09/22 0925   BP: (!) 145/93   Pulse: 64   Temp: 98 °F (36.7 °C)   SpO2: 99%   Weight: 74.1 kg (163 lb 4.8 oz)      General:  No acute distress     Neurologic:   Motor: moves all extremities spontaneously and without difficulty  Gait: normal gait and station    Mental status examination:  Appearance: unremarkable, age appropriate  Level of Consciousness: awake and alert  Behavior/Cooperation: calm and cooperative  Psychomotor: unremarkable  Speech: normal tone, normal rate, normal pitch, normal volume  Language: english, fluid  Orientation: grossly intact, person, place, situation, day of week, month of year, year  Attention Span/Concentration: intact to interview and spells "WORLD" forwards and backwards without error  Memory: Registers 3/3 objects, recalls 3/3 objects at 5 minutes without cuing  Mood: "calm"  Affect: mood congruent and euthymic  Thought Process: linear, goal-directed  Associations: Logical and appropriate  Thought Content: denies SI/HI/paranoia, no delusional ideation volunteered, denies plan or desire for self harm or harm to others  Fund of Knowledge: appropriate for education  Abstraction: proverbs were abstract and similarities were abstract  Insight: good  Judgment: good    Relevant Elements of Neurological Exam: no abnormal involuntary movements observed    Functioning in Relationships:  Spouse/partner: not in dating " relationship  Peers: good  Employers: good    Assessments:   PHQ9: 0  GAD7: 0    ASRS:   Section A: 4 positive responses  Section B: 8 positive responses  Overall: positive screen for adult ADHD    Laboratory Data  No visits with results within 1 Month(s) from this visit.   Latest known visit with results is:   Office Visit on 08/03/2022   Component Date Value Ref Range Status    POC Blood, Urine 08/03/2022 Negative  Negative Final    POC Bilirubin, Urine 08/03/2022 Negative  Negative Final    POC Urobilinogen, Urine 08/03/2022 1.0  0.3 - 2.2 Final    POC Ketones, Urine 08/03/2022 Positive (A)  Negative Final    POC Protein, Urine 08/03/2022 Negative  Negative Final    POC Nitrates, Urine 08/03/2022 Negative  Negative Final    POC Glucose, Urine 08/03/2022 Negative  Negative Final    pH, UA 08/03/2022 6.0   Final    POC Specific Gravity, Urine 08/03/2022 1.020  1.003 - 1.029 Final    POC Leukocytes, Urine 08/03/2022 Negative  Negative Final    Sodium Level 08/03/2022 137  136 - 145 mmol/L Final    Potassium Level 08/03/2022 3.9  3.5 - 5.1 mmol/L Final    Chloride 08/03/2022 100  98 - 107 mmol/L Final    Carbon Dioxide 08/03/2022 30 (H)  22 - 29 mmol/L Final    Glucose Level 08/03/2022 85  74 - 100 mg/dL Final    Blood Urea Nitrogen 08/03/2022 14.5  8.9 - 20.6 mg/dL Final    Creatinine 08/03/2022 0.92  0.73 - 1.18 mg/dL Final    Calcium Level Total 08/03/2022 10.0  8.4 - 10.2 mg/dL Final    Protein Total 08/03/2022 8.1  6.4 - 8.3 gm/dL Final    Albumin Level 08/03/2022 4.2  3.5 - 5.0 gm/dL Final    Globulin 08/03/2022 3.9 (H)  2.4 - 3.5 gm/dL Final    Albumin/Globulin Ratio 08/03/2022 1.1  1.1 - 2.0 ratio Final    Bilirubin Total 08/03/2022 1.2  <=1.5 mg/dL Final    Alkaline Phosphatase 08/03/2022 82  40 - 150 unit/L Final    Alanine Aminotransferase 08/03/2022 16  0 - 55 unit/L Final    Aspartate Aminotransferase 08/03/2022 20  5 - 34 unit/L Final    Estimated GFR-Non  08/03/2022 >60  mls/min/1.73/m2  Final    Amylase Level 08/03/2022 81  25 - 125 unit/L Final    Lipase Level 08/03/2022 24  <=60 U/L Final    WBC 08/03/2022 6.6  4.5 - 11.5 x10(3)/mcL Final    RBC 08/03/2022 5.03  4.70 - 6.10 x10(6)/mcL Final    Hgb 08/03/2022 16.5  14.0 - 18.0 gm/dL Final    Hct 08/03/2022 46.7  42.0 - 52.0 % Final    MCV 08/03/2022 92.8  80.0 - 94.0 fL Final    MCH 08/03/2022 32.8 (H)  27.0 - 31.0 pg Final    MCHC 08/03/2022 35.3  33.0 - 36.0 mg/dL Final    RDW 08/03/2022 12.3  11.5 - 17.0 % Final    Platelet 08/03/2022 156  130 - 400 x10(3)/mcL Final    MPV 08/03/2022 11.7 (H)  7.4 - 10.4 fL Final    Neut % 08/03/2022 61.6  % Final    Lymph % 08/03/2022 28.4  % Final    Mono % 08/03/2022 6.7  % Final    Eos % 08/03/2022 2.6  % Final    Basophil % 08/03/2022 0.5  % Final    Lymph # 08/03/2022 1.86  0.6 - 4.6 x10(3)/mcL Final    Neut # 08/03/2022 4.1  2.1 - 9.2 x10(3)/mcL Final    Mono # 08/03/2022 0.44  0.1 - 1.3 x10(3)/mcL Final    Eos # 08/03/2022 0.17  0 - 0.9 x10(3)/mcL Final    Baso # 08/03/2022 0.03  0 - 0.2 x10(3)/mcL Final    IG# 08/03/2022 0.01  0 - 0.04 x10(3)/mcL Final    IG% 08/03/2022 0.2  % Final    NRBC% 08/03/2022 0.0  % Final    Hepatitis B Surface Antigen 08/03/2022 Nonreactive  Nonreactive Final    Hepatitis A IgM 08/03/2022 Nonreactive  Nonreactive Final    Hep A IGG 08/03/2022 Nonreactive  Nonreactive Final    Hepatitis C Antibody 08/03/2022 Nonreactive  Nonreactive Final     Assessment - Diagnosis - Goals:     Dex Ma, a 42 y.o. male, presenting for initial evaluation visit.     Impression:       ICD-10-CM ICD-9-CM   1. ADHD (attention deficit hyperactivity disorder), inattentive type  F90.0 314.00   2. Alcohol use disorder, moderate, in sustained remission  F10.21 303.93     Strengths and Liabilities: Strength: Patient accepts guidance/feedback, Strength: Patient is expressive/articulate., Strength: Patient is intelligent.    Treatment Goals:  Specify outcomes written in observable, behavioral  terms:   Anxiety: acquiring relapse prevention skills  ADHD:  ADHD: maximize treatment adherence, utilize behavioral strategies to address inattention    Treatment Plan/Recommendations:   Start wellbutrin XL 150mg daily, Discussed potential SE including but not limited to anxiety, irritability, restlessness, palpitations, suicidal thinking  Discussed that diagnosis of liver failure and portal hypertension complicates management of inattention symptoms, will avoid stimulants as possible to decrease potential for worsening hypertension  Consider trial of strattera if wellbutrin ineffective  Discussed importance of avoiding relapses on alcohol in the future, discussed need to manage triggers  Pt denies cravings for alcohol, discussion option for MAT if cravings become problematic (likely acamprosate)  Recent labwork in EMR reviewed, CBC and CMP much improved relative to prior  Will obtain TSH, FT4 and UDS  No need for PEC as pt is not an imminent danger to self or others or gravely disabled due to acute psychiatric illness  Discussed that pt should either call clinic for psychiatric crisis symptoms or present to nearest emergency room    Discussed with patient informed consent including diagnosis, risks and benefits of proposed treatment above vs. alternative treatments vs. no treatment, as well as serious and common side effects of these treatments, and the inherent unpredictability of individual responses to these treatments. The patient expresses understanding of the above and displays the capacity to agree with this current plan. Patient also agrees that, currently, the benefits outweigh the risks and would like to pursue treatment at this time, and had no other questions.    Instructions:  Take all medications as prescribed.    Abstain from recreational drugs and alcohol.  Present to ED or call 911 for SI/HI plan or intent, psychosis, or medical emergency.    Return to Clinic: Follow up in about 4 weeks (around  12/7/2022).    Total time:   Complexity (level) of medical decision making employed in the encounter: MODERATE    The total time for services performed on the date of the encounter: 45 minutes    Erick Blanco MD  [facility]

## 2022-11-10 LAB
AMPHET UR QL SCN: NEGATIVE
BARBITURATE SCN PRESENT UR: NEGATIVE
BENZODIAZ UR QL SCN: NEGATIVE
CANNABINOIDS UR QL SCN: NEGATIVE
COCAINE UR QL SCN: NEGATIVE
FENTANYL UR QL SCN: NEGATIVE
MDMA UR QL SCN: NEGATIVE
OPIATES UR QL SCN: NEGATIVE
PCP UR QL: NEGATIVE
PH UR: 5.5 [PH] (ref 3–11)
SPECIFIC GRAVITY, URINE AUTO (.000) (OHS): 1.02 (ref 1–1.03)

## 2023-02-06 ENCOUNTER — OFFICE VISIT (OUTPATIENT)
Dept: GASTROENTEROLOGY | Facility: CLINIC | Age: 43
End: 2023-02-06
Payer: MEDICAID

## 2023-02-06 VITALS
RESPIRATION RATE: 16 BRPM | SYSTOLIC BLOOD PRESSURE: 131 MMHG | TEMPERATURE: 98 F | HEIGHT: 69 IN | HEART RATE: 77 BPM | DIASTOLIC BLOOD PRESSURE: 82 MMHG | WEIGHT: 157.88 LBS | BODY MASS INDEX: 23.38 KG/M2

## 2023-02-06 DIAGNOSIS — K70.30 ALCOHOLIC CIRRHOSIS OF LIVER WITHOUT ASCITES: Primary | ICD-10-CM

## 2023-02-06 PROCEDURE — 99214 OFFICE O/P EST MOD 30 MIN: CPT | Mod: PBBFAC | Performed by: STUDENT IN AN ORGANIZED HEALTH CARE EDUCATION/TRAINING PROGRAM

## 2023-02-06 NOTE — PROGRESS NOTES
Subjective:       Patient ID: Dex Ma is a 42 y.o. male.    Chief Complaint: Cirrhosis (No complaints)    42-year-old male history of alcoholic cirrhosis, previously hospitalized here at our facility 2021, presenting to clinic today for his follow-up appointment.  He was hospitalized in October 2021 with ascites and decompensated cirrhotic liver disease.  Had presented with weakness and abdominal distension have been going on for weeks prior to his admission with poor p.o. intake and decreased appetite.  Previously used to drink 2 pt of liquor a day up until a few months prior to this prior admission when he had decreased his intake.  At prior to his admission was drinking half a pt to 1 pt of liquor a day.  Smokes cigarettes.  Prior cocaine and marijuana use.  But has not used drugs in if years.       Gastroenterology evaluated patient at that time as MELD was 29, Child Vu class C.  Required a paracentesis on that admission that showed SAAG 1.5, total protein less than 0.8 suggesting  portal hypertension.  Of hyponatremic on admission and diuretics were held at that time and required albumin after his paracentesis.  He was placed on Bactrim once daily for SBP prophylaxis.  EGD was not done inpatient.       Patient had been seen by the residents outpatient after discharge his MELD was already improving total bilirubin had decreased from 18 on the hospital admission down to 5.4.  MELD and a clinic visit calculated to be 21 and Child Vu score was down to be.  Had remained absent from alcohol that time.  Reduced swelling distension along with decreased swelling in lower extremities.       Patient seen today, his recent MELD is 5, Child Vu class a of labs done in March 2022.  His EGD was also performed in March 2022 that showed a normal esophagus, stomach and duodenum.  He still needs an updated abdominal ultrasound.  His last imaging performed was a CT of the abdomen done October 2021 that showed  stigmata of cirrhosis with portal hypertension diffuse body wall edema and bilateral pleural effusions as well.  He needs refills on his folate, B12, lactulose.     He denies any melena, hematochezia, hematemesis.  Denies any jaundice or abdominal swelling or lower extremity swelling.    Today's visit:  No complaints.  Continues to abstain from alcohol.  Most updated abdominal ultrasound August 2022 revealing only hepatic steatosis, no masses or lesions present.  Off diuretics at this point.  Denies any melena, hematochezia, jaundice.  Review of Systems   Constitutional: Negative.    HENT: Negative.     Eyes: Negative.    Respiratory: Negative.     Cardiovascular: Negative.    Gastrointestinal: Negative.    Endocrine: Negative.    Genitourinary: Negative.    Musculoskeletal: Negative.    Integumentary:  Negative.   Allergic/Immunologic: Negative.    Neurological: Negative.    Hematological: Negative.    Psychiatric/Behavioral: Negative.         Objective:   Vitals:    02/06/23 1456   BP: 131/82   Pulse: 77   Resp: 16   Temp: 98.2 °F (36.8 °C)           Physical Exam  Constitutional:       Appearance: Normal appearance. He is normal weight.   HENT:      Head: Normocephalic and atraumatic.      Mouth/Throat:      Mouth: Mucous membranes are moist.      Pharynx: Oropharynx is clear.   Eyes:      Extraocular Movements: Extraocular movements intact.      Conjunctiva/sclera: Conjunctivae normal.      Pupils: Pupils are equal, round, and reactive to light.   Cardiovascular:      Rate and Rhythm: Normal rate and regular rhythm.   Pulmonary:      Effort: Pulmonary effort is normal.      Breath sounds: Normal breath sounds.   Abdominal:      General: Abdomen is flat. Bowel sounds are normal.      Palpations: Abdomen is soft.   Musculoskeletal:         General: Normal range of motion.   Skin:     General: Skin is warm and dry.   Neurological:      General: No focal deficit present.      Mental Status: He is alert and oriented  to person, place, and time. Mental status is at baseline.   Psychiatric:         Mood and Affect: Mood normal.         Thought Content: Thought content normal.         Judgment: Judgment normal.       Assessment:       Problem List Items Addressed This Visit    None  Visit Diagnoses       Alcoholic cirrhosis of liver without ascites    -  Primary    Relevant Orders    US Abdomen Limited_Liver    Protime-INR    Comprehensive Metabolic Panel              Plan:       Background:  Alcohol:  Yes, in the past     Tobacco:  Cigarettes     Liver disease:  Alcohol     MELD-Na:  5  Child-Vu Class: A     Transplant: not under evaluation, low MELD     Cirrhosis is decompensated with:     Ascites:  In the past yes, in his October 2021 admission went been on Lasix 40 mg Aldactone 100 mg previously; paracentesis performed October 2021 revealed SANDRO G1 0.5, total protein less than 0.8, suggestive of portal hypertension.  Spontaneous bacterial peritonitis: No  Hepatic Encephalopathy: No  Hepatocellular carcinoma: No  Hepatorenal syndrome: No  Hyponatremia: No  Muscle wasting: No  Portal vein thrombosis: No     Screening:  Last EGD:  March 2022:  Normal esophagus, stomach, duodenum.        Last imaging study:  Abdominal ultrasound August 2022: Hepatic steatosis.  No masses/lesions present.     CIRRHOSIS COUNSELING:  - strict abstinence of alcohol use  - low sodium (salt) 2000mg per day  - Nutrition: 25-30kcal (calorie per body weight in kilogram) per day  - No need to restrict protein in diet  - High protein diet: 1.2-1.5 gram/kg (protein per body weight in kg) per day to prevent muscle mass loss  - Resistance exercises for muscle strength  - Avoid raw seafoods due to risk of fatal Vibrio vulnificus infection  - Avoid Non-steroidal anti-inflammatory drugs (NSAIDs) such as ibuprofen, Motrin, naproxen, Aleve due to risk of kidney damage  - Can take acetaminophen (tylenol), no more than 2000mg per day  - Compliance with all  medications  - Ultrasound or MRI of the liver every 6 months for liver cancer screening  - Upper endoscopy every 1-2 years to screen for varices      Six-month follow-up, repeat MELD labs in 2 weeks when he also comes for his abdominal ultrasound.    Not due for updated EGD until 2024.  Continue to abstain from alcohol.

## 2023-02-09 ENCOUNTER — HOSPITAL ENCOUNTER (OUTPATIENT)
Dept: RADIOLOGY | Facility: HOSPITAL | Age: 43
Discharge: HOME | End: 2023-02-09
Attending: STUDENT IN AN ORGANIZED HEALTH CARE EDUCATION/TRAINING PROGRAM
Payer: MEDICAID

## 2023-02-09 DIAGNOSIS — K70.30 ALCOHOLIC CIRRHOSIS OF LIVER WITHOUT ASCITES: ICD-10-CM

## 2023-02-09 PROCEDURE — 76705 ECHO EXAM OF ABDOMEN: CPT | Mod: TC

## 2023-02-20 ENCOUNTER — LAB VISIT (OUTPATIENT)
Dept: LAB | Facility: HOSPITAL | Age: 43
End: 2023-02-20
Attending: STUDENT IN AN ORGANIZED HEALTH CARE EDUCATION/TRAINING PROGRAM
Payer: MEDICAID

## 2023-02-20 DIAGNOSIS — K70.30 ALCOHOLIC CIRRHOSIS OF LIVER WITHOUT ASCITES: ICD-10-CM

## 2023-02-20 LAB
ALBUMIN SERPL-MCNC: 4.2 G/DL (ref 3.5–5)
ALBUMIN/GLOB SERPL: 1.2 RATIO (ref 1.1–2)
ALP SERPL-CCNC: 70 UNIT/L (ref 40–150)
ALT SERPL-CCNC: 16 UNIT/L (ref 0–55)
AST SERPL-CCNC: 19 UNIT/L (ref 5–34)
BILIRUBIN DIRECT+TOT PNL SERPL-MCNC: 0.5 MG/DL
BUN SERPL-MCNC: 10.4 MG/DL (ref 8.9–20.6)
CALCIUM SERPL-MCNC: 10 MG/DL (ref 8.4–10.2)
CHLORIDE SERPL-SCNC: 108 MMOL/L (ref 98–107)
CO2 SERPL-SCNC: 26 MMOL/L (ref 22–29)
CREAT SERPL-MCNC: 1.13 MG/DL (ref 0.73–1.18)
GFR SERPLBLD CREATININE-BSD FMLA CKD-EPI: >60 MLS/MIN/1.73/M2
GLOBULIN SER-MCNC: 3.4 GM/DL (ref 2.4–3.5)
GLUCOSE SERPL-MCNC: 99 MG/DL (ref 74–100)
POTASSIUM SERPL-SCNC: 5.1 MMOL/L (ref 3.5–5.1)
PROT SERPL-MCNC: 7.6 GM/DL (ref 6.4–8.3)
SODIUM SERPL-SCNC: 143 MMOL/L (ref 136–145)

## 2023-02-20 PROCEDURE — 36415 COLL VENOUS BLD VENIPUNCTURE: CPT

## 2023-02-20 PROCEDURE — 85610 PROTHROMBIN TIME: CPT

## 2023-02-20 PROCEDURE — 80053 COMPREHEN METABOLIC PANEL: CPT

## 2023-03-09 ENCOUNTER — OFFICE VISIT (OUTPATIENT)
Dept: INTERNAL MEDICINE | Facility: CLINIC | Age: 43
End: 2023-03-09
Payer: MEDICAID

## 2023-03-09 VITALS
RESPIRATION RATE: 18 BRPM | WEIGHT: 156 LBS | HEIGHT: 69 IN | TEMPERATURE: 99 F | BODY MASS INDEX: 23.11 KG/M2 | OXYGEN SATURATION: 99 % | DIASTOLIC BLOOD PRESSURE: 85 MMHG | SYSTOLIC BLOOD PRESSURE: 124 MMHG | HEART RATE: 68 BPM

## 2023-03-09 DIAGNOSIS — L71.9 ROSACEA: ICD-10-CM

## 2023-03-09 DIAGNOSIS — Z72.0 TOBACCO USE: ICD-10-CM

## 2023-03-09 DIAGNOSIS — F10.21 HISTORY OF ALCOHOLISM: Primary | ICD-10-CM

## 2023-03-09 DIAGNOSIS — K70.30 ALCOHOLIC CIRRHOSIS OF LIVER WITHOUT ASCITES: ICD-10-CM

## 2023-03-09 PROCEDURE — 99214 OFFICE O/P EST MOD 30 MIN: CPT | Mod: PBBFAC

## 2023-03-09 RX ORDER — FOLIC ACID 1 MG/1
TABLET ORAL
Qty: 90 TABLET | Refills: 3 | Status: SHIPPED | OUTPATIENT
Start: 2023-03-09 | End: 2023-07-26 | Stop reason: SDUPTHER

## 2023-03-09 RX ORDER — THIAMINE HCL 100 MG
100 TABLET ORAL DAILY
Qty: 90 TABLET | Refills: 3 | Status: SHIPPED | OUTPATIENT
Start: 2023-03-09 | End: 2023-07-26 | Stop reason: SDUPTHER

## 2023-03-09 RX ORDER — PANTOPRAZOLE SODIUM 40 MG/1
40 TABLET, DELAYED RELEASE ORAL
Qty: 30 TABLET | Refills: 3 | Status: CANCELLED | OUTPATIENT
Start: 2023-03-09

## 2023-03-09 RX ORDER — FUROSEMIDE 20 MG/1
TABLET ORAL
Qty: 60 TABLET | Refills: 6 | Status: CANCELLED | OUTPATIENT
Start: 2023-03-09

## 2023-03-09 RX ORDER — SPIRONOLACTONE 25 MG/1
TABLET ORAL
Qty: 120 TABLET | Refills: 3 | Status: CANCELLED | OUTPATIENT
Start: 2023-03-09

## 2023-03-09 RX ORDER — LACTULOSE 10 G/15ML
10 SOLUTION ORAL; RECTAL 2 TIMES DAILY
Qty: 900 ML | Refills: 8 | Status: CANCELLED | OUTPATIENT
Start: 2023-03-09 | End: 2023-11-28

## 2023-03-09 RX ORDER — FOLIC ACID 1 MG/1
TABLET ORAL
Qty: 90 TABLET | Refills: 3 | Status: CANCELLED | OUTPATIENT
Start: 2023-03-09

## 2023-03-09 NOTE — PROGRESS NOTES
[facility] Clinic Visit Note    CC: Routine Follow-up    HPI   Mr. Dex Ma is a 42-year-old  male with a past medical history of of alcoholic liver cirrhosis. Only c/o redness to his face that has not resolved with cerave.    Review of Systems  14 pt ROS negative unless mentioned above    Physical Examination  Vitals:    03/09/23 1230   BP: 124/85   Pulse: 68   Resp: 18   Temp: 98.6 °F (37 °C)         General appearance: alert, cooperative, no distress  HENT: Normocephalic, atraumatic, neck symmetrical, no nasal discharge, No scleral icterus  Lungs: clear to auscultation bilaterally, no dullness to percussion bilaterally  Heart: regular rate and rhythm without rub; no displacement of the PMI   Abdomen: soft, non-tender; bowel sounds normoactive; no organomegaly  Extremities: extremities symmetric; no clubbing, cyanosis, or edema  Neurologic: Alert and oriented X 3, normal strength, normal coordination and gait  Psych: slightly fast pace of speaking, comprehensible, no flight of idea, does seem slightly easily distractible    Derm:      Assessment/Plan:    Suspect Rosacea of face  -Eucerin cream BID, referred to dermatology  -Advised to use SPF > 50    Hx of decompensated cirrhotic liver disease 2/2 chronic alcohol use  -currently compensated, MELD-Na & 3.0: 6, Child-Vu Class A (5 pts)  Hep C neg  -dx at hospital admission 10/2021 (original Meld 29, CPC-C, SAG 1.5, total protein <0.8 indicative of portal hypertension )  -Continue to abstain from alcohol use, avoid excessive use of  hepatotoxic medication  -Continue to follow cirrhosis clinic with Dr. Hernandez  -LAISHA (02/09/2023: coarse echotexture of liver and pancreas-stable, no masses or ascites seen)  -EGD by Dr. Lomas 03/28/2022 no abnormalities noted  -Lactulose TID PRN fro 3-4 BM/day, lasix 40mg PRN, aldactone 100mg PRN if he becomes decompensated again. Otherwise can hold off for now  -continue thiamine and folate supplementation, will  check levels and assess for discontinuation next visit    Decreased concentration  -Visit with Dr. Blanco to evaluate for ADHD, initiated on Wellbutrin 150mg daily    Current Tobacco User  -10 cigs/day, advised cessation and and offered referral to cessation counseling not ready yet    HM  -refused vaccines (no specific reason given)      There is no immunization history on file for this patient.      F/U in 4 months, labs today    Lionel Reno MD  PGY-3, Internal Medicine Resident

## 2023-03-14 NOTE — PROGRESS NOTES
Attending Addendum:   Patient seen and examined in clinic. Management and Plan were discussed with resident. Care was reasonable and necessary.   Kaylee Mejia MD  [facility] - Internal Medicine

## 2023-05-12 ENCOUNTER — OFFICE VISIT (OUTPATIENT)
Dept: FAMILY MEDICINE | Facility: CLINIC | Age: 43
End: 2023-05-12
Payer: MEDICAID

## 2023-05-12 VITALS
DIASTOLIC BLOOD PRESSURE: 86 MMHG | HEIGHT: 69 IN | HEART RATE: 87 BPM | OXYGEN SATURATION: 99 % | WEIGHT: 150 LBS | TEMPERATURE: 98 F | RESPIRATION RATE: 16 BRPM | SYSTOLIC BLOOD PRESSURE: 127 MMHG | BODY MASS INDEX: 22.22 KG/M2

## 2023-05-12 DIAGNOSIS — L21.9 SEBORRHEIC DERMATITIS: ICD-10-CM

## 2023-05-12 DIAGNOSIS — L25.9 CONTACT DERMATITIS, UNSPECIFIED CONTACT DERMATITIS TYPE, UNSPECIFIED TRIGGER: Primary | ICD-10-CM

## 2023-05-12 PROBLEM — L71.9 ROSACEA: Status: ACTIVE | Noted: 2023-05-12

## 2023-05-12 PROCEDURE — 99214 OFFICE O/P EST MOD 30 MIN: CPT | Mod: PBBFAC | Performed by: FAMILY MEDICINE

## 2023-05-12 RX ORDER — TACROLIMUS 1 MG/G
OINTMENT TOPICAL 2 TIMES DAILY
Qty: 30 G | Refills: 1 | Status: SHIPPED | OUTPATIENT
Start: 2023-05-12 | End: 2024-01-08 | Stop reason: SDUPTHER

## 2023-05-12 RX ORDER — TACROLIMUS 1 MG/G
OINTMENT TOPICAL 2 TIMES DAILY
Qty: 30 G | Refills: 1 | Status: SHIPPED | OUTPATIENT
Start: 2023-05-12 | End: 2023-05-12

## 2023-05-12 RX ORDER — PREDNISONE 20 MG/1
20 TABLET ORAL 2 TIMES DAILY
Qty: 10 TABLET | Refills: 0 | Status: SHIPPED | OUTPATIENT
Start: 2023-05-12

## 2023-05-12 NOTE — PROGRESS NOTES
Subjective     Patient ID: Dex Ma is a 43 y.o. male.    Chief Complaint: Rash (Rash on face.)    HPI 42 yo male with PMH of liver cirrhosis, tobacco use presents to derm screening clinic. Patient reporting rash on face. Reports face is sensitive. Has not used anything on it other than cerave x 1. Occasionally itchy.  Denied pimples,pustules    Review of Systems  Per HPI     Objective     Physical Exam  Vitals:    05/12/23 1015   BP: 127/86   Pulse: 87   Resp: 16   Temp: 97.5 °F (36.4 °C)     Integumentary: Areas of patchy erythema on bilateral cheeks, forehead, face     Assessment and Plan     Problem List Items Addressed This Visit          Derm    Rosacea     Other Visit Diagnoses       Contact dermatitis, unspecified contact dermatitis type, unspecified trigger    -  Primary        Area is extremely inflamed. Will treat with prednisone 40mg x 5 days first  Tacrolimus topical and aquaphor BID after completion of prednisone  Patient elected to proceed with oral steroids  Discussed general skin care regimen with patient  RTC  6- 8 weeks

## 2023-07-26 ENCOUNTER — OFFICE VISIT (OUTPATIENT)
Dept: INTERNAL MEDICINE | Facility: CLINIC | Age: 43
End: 2023-07-26
Payer: MEDICAID

## 2023-07-26 VITALS
DIASTOLIC BLOOD PRESSURE: 72 MMHG | RESPIRATION RATE: 18 BRPM | BODY MASS INDEX: 22.78 KG/M2 | HEIGHT: 69 IN | TEMPERATURE: 98 F | WEIGHT: 153.81 LBS | HEART RATE: 80 BPM | OXYGEN SATURATION: 98 % | SYSTOLIC BLOOD PRESSURE: 116 MMHG

## 2023-07-26 DIAGNOSIS — L71.9 ROSACEA: ICD-10-CM

## 2023-07-26 DIAGNOSIS — F90.2 ATTENTION DEFICIT HYPERACTIVITY DISORDER (ADHD), COMBINED TYPE: ICD-10-CM

## 2023-07-26 DIAGNOSIS — F10.21 HISTORY OF ALCOHOLISM: ICD-10-CM

## 2023-07-26 DIAGNOSIS — K70.30 ALCOHOLIC CIRRHOSIS OF LIVER WITHOUT ASCITES: Primary | ICD-10-CM

## 2023-07-26 PROCEDURE — 99215 OFFICE O/P EST HI 40 MIN: CPT | Mod: PBBFAC

## 2023-07-26 RX ORDER — METOPROLOL SUCCINATE 50 MG/1
50 TABLET, EXTENDED RELEASE ORAL EVERY MORNING
COMMUNITY
Start: 2023-07-20

## 2023-07-26 RX ORDER — THIAMINE HCL 100 MG
100 TABLET ORAL DAILY
Qty: 90 TABLET | Refills: 3 | Status: SHIPPED | OUTPATIENT
Start: 2023-07-26

## 2023-07-26 RX ORDER — FOLIC ACID 1 MG/1
TABLET ORAL
Qty: 90 TABLET | Refills: 3 | Status: SHIPPED | OUTPATIENT
Start: 2023-07-26

## 2023-07-26 RX ORDER — ERGOCALCIFEROL 1.25 MG/1
CAPSULE ORAL
COMMUNITY
Start: 2023-05-18

## 2023-07-26 NOTE — PROGRESS NOTES
[facility]  CLINIC NOTE    Patient Name: Dex Ma  YOB: 1980  Chief Complaint: Follow-up (No c/o voiced at this time) and Medication Refill (Requesting med refills ( Vitamin B1& Folic))     PRESENTING HISTORY   History of Present Illness:  Mr. Dex Ma is a 43 y.o. male w/ PMHx of alcohol use disorder, portal hypertension and decompensated liver disease presents to the clinic today for follow-up. Patient does not endorse any complaints at today's visit. Patient reports continued EtOH abstinence. He reports compliance with Thiamine and folate supplementation but needs refills. He denies any recent hospitalizations. Patient states that he has an upcoming appointment with Dr. Hernandez within the next 1-2 months.       Patient was last seen in clinic on 3/9/23 and endorsed the complaint of facial redness. Patient was referred to Dermatology and was seen on 5/12/23. Patient was given a 5d course of steroids and topical Tacrolimus. Patient reports improvement of symptoms. He has a follow-up appointment with Dermatology soon.     Patient was also seen by Psychiatry since last visit and is now currently being treated for ADHD with Adderall.     Review of Systems:  Review of Systems   Constitutional:  Negative for chills, diaphoresis, fever, malaise/fatigue and weight loss.   Respiratory:  Negative for cough, hemoptysis, sputum production, shortness of breath and wheezing.    Cardiovascular:  Negative for chest pain, palpitations, orthopnea, claudication, leg swelling and PND.   Gastrointestinal:  Negative for abdominal pain, blood in stool, constipation, diarrhea, heartburn, melena, nausea and vomiting.   Genitourinary:  Negative for dysuria, frequency, hematuria and urgency.   Neurological:  Negative for dizziness, tingling, tremors, seizures, weakness and headaches.   Psychiatric/Behavioral:  Negative for depression, memory loss and substance abuse. The patient is not  nervous/anxious and does not have insomnia.      PAST HISTORY:     Past Medical History:   Diagnosis Date    Cirrhosis     GERD (gastroesophageal reflux disease)         Past Surgical History:   Procedure Laterality Date    ADENOIDECTOMY      DENTAL SURGERY      TONSILLECTOMY         Family History   Problem Relation Age of Onset    No Known Problems Mother     No Known Problems Father        Social History     Socioeconomic History    Marital status: Single   Occupational History    Occupation:    Tobacco Use    Smoking status: Every Day     Packs/day: 0.50     Types: Cigarettes    Smokeless tobacco: Never   Substance and Sexual Activity    Alcohol use: Not Currently     Comment: past use    Drug use: Not Currently     Types: Marijuana     Comment: socially    Sexual activity: Yes     Partners: Female     Social Determinants of Health     Financial Resource Strain: Low Risk     Difficulty of Paying Living Expenses: Not hard at all   Food Insecurity: No Food Insecurity    Worried About Running Out of Food in the Last Year: Never true    Ran Out of Food in the Last Year: Never true   Transportation Needs: No Transportation Needs    Lack of Transportation (Medical): No    Lack of Transportation (Non-Medical): No   Physical Activity: Inactive    Days of Exercise per Week: 0 days    Minutes of Exercise per Session: 0 min   Stress: No Stress Concern Present    Feeling of Stress : Only a little   Social Connections: Socially Isolated    Frequency of Communication with Friends and Family: Twice a week    Frequency of Social Gatherings with Friends and Family: Once a week    Attends Yazdanism Services: Never    Active Member of Clubs or Organizations: No    Attends Club or Organization Meetings: Never    Marital Status: Never    Housing Stability: Low Risk     Unable to Pay for Housing in the Last Year: No    Number of Places Lived in the Last Year: 1    Unstable Housing in the Last Year: No       MEDICATIONS:  "    Current Outpatient Medications   Medication Instructions    buPROPion (WELLBUTRIN XL) 150 mg, Oral, Daily    dextroamphetamine-amphetamine 10 mg Tab 1 tablet, Oral, 2 times daily    ergocalciferol (ERGOCALCIFEROL) 50,000 unit Cap Oral    EUCERIN PLUS INTENSIVE REPAIR Crea Apply to affected area twice daily    folic acid (FOLVITE) 1 MG tablet <BR>See Instructions, TAKE 1 TABLET BY MOUTH DAILY, # 30 tab(s), 3 Refill(s), Pharmacy: [id number], 177, cm, Height/Length Dosing, 01/24/22 15:49:00 CST, 71.1, kg, Weight Dosing, 01/24/22 15:49:00 CST    furosemide (LASIX) 20 MG tablet   See Instructions, TAKE 2 TABLETS BY MOUTH DAILY, # 60 tab(s), 3 Refill(s), Pharmacy: [facility] #[id number], 177, cm, Height/Length Dosing, 01/24/22 15:49:00 CST, 71.1, kg, Weight Dosing, 01/24/22 15:49:00 CST    gabapentin (NEURONTIN) 600 mg, Oral, 2 times daily    metoprolol succinate (TOPROL-XL) 50 mg, Oral, Every morning    mineral oil-hydrophil petrolat (AQUAPHOR) Oint Topical (Top), As needed (PRN), With tacrolimus    pantoprazole (PROTONIX) 40 mg, Oral, As needed (PRN)    predniSONE (DELTASONE) 20 mg, Oral, 2 times daily    spironolactone (ALDACTONE) 25 MG tablet   See Instructions, TAKE 4 TABLETS BY MOUTH DAILY, # 120 tab(s), 3 Refill(s), Pharmacy: [facility] #[id number], 177, cm, Height/Length Dosing, 01/24/22 15:49:00 CST, 71.1, kg, Weight Dosing, 01/24/22 15:49:00 CST    tacrolimus (PROTOPIC) 0.1 % ointment Topical (Top), 2 times daily    vitamin B complex (VITAMIN B-100 COMPLEX ORAL) TAKE 1 TABLET BY MOUTH ONCE DAILY    VITAMIN B-1 100 mg, Oral, Daily        OBJECTIVE:   Vital Signs:  Vitals:    07/26/23 1334 07/26/23 1336   BP: (!) 146/93 116/72   Pulse: 80    Resp: 18    Temp: 98.3 °F (36.8 °C)    TempSrc: Oral    SpO2: 98%    Weight: 69.8 kg (153 lb 12.8 oz)    Height: 5' 9" (1.753 m)         Physical Exam:  Physical Exam  Constitutional:       Appearance: Normal appearance.   HENT:      Head: " Normocephalic.   Eyes:      Extraocular Movements: Extraocular movements intact.      Pupils: Pupils are equal, round, and reactive to light.   Cardiovascular:      Rate and Rhythm: Normal rate and regular rhythm.      Heart sounds: No murmur heard.    No friction rub. No gallop.   Pulmonary:      Effort: Pulmonary effort is normal.      Breath sounds: Normal breath sounds. No wheezing, rhonchi or rales.   Abdominal:      General: Abdomen is flat. Bowel sounds are normal. There is no distension.      Palpations: Abdomen is soft. There is no mass.      Tenderness: There is no abdominal tenderness. There is no guarding or rebound.   Musculoskeletal:         General: No swelling. Normal range of motion.   Skin:     General: Skin is warm and dry.      Capillary Refill: Capillary refill takes less than 2 seconds.   Neurological:      General: No focal deficit present.      Mental Status: He is alert and oriented to person, place, and time.   Psychiatric:         Mood and Affect: Mood normal.         Behavior: Behavior normal.         Thought Content: Thought content normal.         Judgment: Judgment normal.       Laboratory  Lab Results   Component Value Date     02/20/2023    K 5.1 02/20/2023    CO2 26 02/20/2023    BUN 10.4 02/20/2023    CREATININE 1.13 02/20/2023    CALCIUM 10.0 02/20/2023    BILIDIR 3.7 (H) 11/15/2021    IBILI 1.70 (H) 11/15/2021    ALKPHOS 70 02/20/2023    AST 19 02/20/2023    ALT 16 02/20/2023    MG 2.00 10/27/2021    PHOS 2.4 10/29/2021        Lab Results   Component Value Date    WBC 7.2 03/09/2023    RBC 5.01 03/09/2023    HGB 16.4 03/09/2023    HCT 47.4 03/09/2023    MCV 94.6 (H) 03/09/2023    MCH 32.7 03/09/2023    MCHC 34.6 03/09/2023    RDW 12.3 03/09/2023     03/09/2023    MPV 11.3 (H) 03/09/2023        ASSESSMENT & PLAN:     -Compensated cirrhotic liver disease:  -Diagnosed during hospital admission on 10/2021   -Patient reports continued EtOH abstinence.   -Patient does not  report any recent hospitalizations or symptoms.     -MELD-Na at presentation on 10/2021 was 29; Child-Vu at presentation was Class C  MELD-Na at last visit lab draw (2/20) was 8, Child-Vu Class A (5 pts)  Will reorder labs today    -Patient is to continue to abstain from alcohol use and avoid excessive use of hepatotoxic medication  -Continue to follow cirrhosis clinic with Dr. Hernandez. Patient states he has an appointment within the next 2 months with f/u abdominal US.  -RUQ US conducted on 02/09/2023 displayed coarse echotexture of liver and pancreas-stable, no masses or ascites seen.  -EGD on 3/28/2022 displayed no abnormalities  -Patient reports adherence to Thiamine and Folate supplementation. Refills sent.  -Will repeat labs today to include: CBC, CMP, TSH, PT/INR, Folate, Thiamine     -Rosacea of face  -Patient was seen by Dermatology on 5/12/23  -Was prescribed Prednisone 40mg x5d and topical tacrolimus with instructions for Aquaphor BID after steroid course completion.  -At today's appointment patient reports improvement and has an upcoming f/u with Dermatology      -ADHD:   -Patient is following Psychiatrist for management.  -Currently taking Adderall and denies side-effects.      -Tobacco use   -Patient reports that he has cut back on cigarette use to <1ppd. He states that he is not yet ready to quit. Spoke to patient about benefits of quitting and discussed resources available when ready to quit.     Health Maintenance/ Wellness    There is no immunization history on file for this patient.    Immunizations: Patient declined vaccines at today's visit  Screening colonoscopy: Will schedule at next appointment in 6 months  Hepatitis Panel: Up to date    Counseling:  - Patient counselled on smoking cessation  - Educated on diet (portion control) and exercise (at least 30 minutes per day)  - Relevant educational materials provided    Medications: reconciled, discussed and refills given.  Follow up in about  6 months (around 1/26/2024).    Wil Harrington DO  07/26/2023, 8:42 AM

## 2023-08-07 ENCOUNTER — OFFICE VISIT (OUTPATIENT)
Dept: GASTROENTEROLOGY | Facility: CLINIC | Age: 43
End: 2023-08-07
Payer: MEDICAID

## 2023-08-07 VITALS
SYSTOLIC BLOOD PRESSURE: 119 MMHG | HEIGHT: 66 IN | BODY MASS INDEX: 24.59 KG/M2 | TEMPERATURE: 98 F | RESPIRATION RATE: 16 BRPM | WEIGHT: 153 LBS | DIASTOLIC BLOOD PRESSURE: 82 MMHG | OXYGEN SATURATION: 97 % | HEART RATE: 92 BPM

## 2023-08-07 DIAGNOSIS — K70.30 ALCOHOLIC CIRRHOSIS OF LIVER WITHOUT ASCITES: Primary | ICD-10-CM

## 2023-08-07 PROCEDURE — 99215 OFFICE O/P EST HI 40 MIN: CPT | Mod: PBBFAC | Performed by: STUDENT IN AN ORGANIZED HEALTH CARE EDUCATION/TRAINING PROGRAM

## 2023-08-07 NOTE — PROGRESS NOTES
Subjective     Patient ID: Dex Ma is a 43 y.o. male.    Chief Complaint: Alcoholic Cirrhosis (No longer drinking. No complaints.)    42-year-old male history of alcoholic cirrhosis, previously hospitalized here at our facility 2021, presenting to clinic today for his follow-up appointment.  He was hospitalized in October 2021 with ascites and decompensated cirrhotic liver disease.  Had presented with weakness and abdominal distension have been going on for weeks prior to his admission with poor p.o. intake and decreased appetite.  Previously used to drink 2 pt of liquor a day up until a few months prior to this prior admission when he had decreased his intake.  At prior to his admission was drinking half a pt to 1 pt of liquor a day.  Smokes cigarettes.  Prior cocaine and marijuana use.  But has not used drugs in if years.       Gastroenterology evaluated patient at that time as MELD was 29, Child Vu class C.  Required a paracentesis on that admission that showed SAAG 1.5, total protein less than 0.8 suggesting  portal hypertension.  Of hyponatremic on admission and diuretics were held at that time and required albumin after his paracentesis.  He was placed on Bactrim once daily for SBP prophylaxis.  EGD was not done inpatient.       Patient had been seen by the residents outpatient after discharge his MELD was already improving total bilirubin had decreased from 18 on the hospital admission down to 5.4.  MELD and a clinic visit calculated to be 21 and Child Vu score was down to be.  Had remained absent from alcohol that time.  Reduced swelling distension along with decreased swelling in lower extremities.       Patient seen today, his recent MELD is 5, Child Vu class a of labs done in March 2022.  His EGD was also performed in March 2022 that showed a normal esophagus, stomach and duodenum.  He still needs an updated abdominal ultrasound.  His last imaging performed was a CT of the abdomen done  October 2021 that showed stigmata of cirrhosis with portal hypertension diffuse body wall edema and bilateral pleural effusions as well.  He needs refills on his folate, B12, lactulose.     He denies any melena, hematochezia, hematemesis.  Denies any jaundice or abdominal swelling or lower extremity swelling.     8/7/23:  No complaints.  Continues to abstain from alcohol.  Most updated abdominal ultrasound August 2022 revealing only hepatic steatosis, no masses or lesions present.  Off diuretics at this point.  Denies any melena, hematochezia, jaundice.    Today's visit:  No complaints, recently saw his new PCP 2 weeks ago, updated MELD labs at that time revealed MELD 9, Child Vu class a.  HCC screening performed 2/9/2 3 reveals no hepatic lesions.  Denies any melena, hematochezia, jaundice, hematemesis.  Review of Systems   Constitutional: Negative.    HENT: Negative.     Eyes: Negative.    Respiratory: Negative.     Cardiovascular: Negative.    Gastrointestinal: Negative.    Endocrine: Negative.    Genitourinary: Negative.    Musculoskeletal: Negative.    Allergic/Immunologic: Negative.    Neurological: Negative.    Hematological: Negative.    Psychiatric/Behavioral: Negative.          Objective   Vitals:    08/07/23 1412   BP: 119/82   Pulse: 92   Resp: 16   Temp: 97.8 °F (36.6 °C)         Physical Exam  Constitutional:       Appearance: Normal appearance. He is normal weight.   HENT:      Head: Normocephalic and atraumatic.      Nose: Nose normal.      Mouth/Throat:      Mouth: Mucous membranes are moist.      Pharynx: Oropharynx is clear.   Eyes:      Conjunctiva/sclera: Conjunctivae normal.      Pupils: Pupils are equal, round, and reactive to light.   Cardiovascular:      Rate and Rhythm: Normal rate and regular rhythm.      Pulses: Normal pulses.      Heart sounds: Normal heart sounds.   Pulmonary:      Effort: Pulmonary effort is normal.      Breath sounds: Normal breath sounds.   Abdominal:      General:  Abdomen is flat. Bowel sounds are normal.      Palpations: Abdomen is soft.   Musculoskeletal:         General: Normal range of motion.   Skin:     General: Skin is warm and dry.   Neurological:      General: No focal deficit present.      Mental Status: He is alert and oriented to person, place, and time. Mental status is at baseline.   Psychiatric:         Mood and Affect: Mood normal.         Thought Content: Thought content normal.         Judgment: Judgment normal.        Assessment and Plan     1. Alcoholic cirrhosis of liver without ascites  -     US Abdomen Limited_Liver; Future; Expected date: 08/14/2023        Background:  Alcohol:  Yes, in the past     Tobacco:  Cigarettes     Liver disease:  Alcohol     MELD-Na:  8  Child-Vu Class: A     Transplant: not under evaluation, low MELD     Cirrhosis is decompensated with:     Ascites:  In the past yes, in his October 2021 admission went been on Lasix 40 mg Aldactone 100 mg previously; paracentesis performed October 2021 revealed SANDRO G1 0.5, total protein less than 0.8, suggestive of portal hypertension.  Spontaneous bacterial peritonitis: No  Hepatic Encephalopathy: No  Hepatocellular carcinoma: No  Hepatorenal syndrome: No  Hyponatremia: No  Muscle wasting: No  Portal vein thrombosis: No     Screening:  Last EGD:  March 2022:  Normal esophagus, stomach, duodenum.        Last imaging study:  Abdominal ultrasound 2/9/23: Nonspecific coarse echotexture to the liver and pancreas overall unchanged since prior examination     Otherwise unremarkable     CIRRHOSIS COUNSELING:  - strict abstinence of alcohol use  - low sodium (salt) 2000mg per day  - Nutrition: 25-30kcal (calorie per body weight in kilogram) per day  - No need to restrict protein in diet  - High protein diet: 1.2-1.5 gram/kg (protein per body weight in kg) per day to prevent muscle mass loss  - Resistance exercises for muscle strength  - Avoid raw seafoods due to risk of fatal Vibrio vulnificus  infection  - Avoid Non-steroidal anti-inflammatory drugs (NSAIDs) such as ibuprofen, Motrin, naproxen, Aleve due to risk of kidney damage  - Can take acetaminophen (tylenol), no more than 2000mg per day  - Compliance with all medications  - Ultrasound or MRI of the liver every 6 months for liver cancer screening  - Upper endoscopy every 1-2 years to screen for varices       Six-month follow-up, next abdominal ultrasound for HCC screening will be due this month.    Up-to-date on MELD labs, EGD.    Continue following with Dr. Harrington his new PCP.    Follow up in about 6 months (around 2/7/2024).

## 2023-08-14 ENCOUNTER — HOSPITAL ENCOUNTER (OUTPATIENT)
Dept: RADIOLOGY | Facility: HOSPITAL | Age: 43
Discharge: HOME | End: 2023-08-14
Attending: STUDENT IN AN ORGANIZED HEALTH CARE EDUCATION/TRAINING PROGRAM
Payer: MEDICAID

## 2023-08-14 DIAGNOSIS — K70.30 ALCOHOLIC CIRRHOSIS OF LIVER WITHOUT ASCITES: ICD-10-CM

## 2023-08-14 PROCEDURE — 76705 ECHO EXAM OF ABDOMEN: CPT | Mod: TC

## 2023-08-16 LAB
INR PPP: 1.1
PROTHROMBIN TIME: 13.5 SECONDS (ref 11.4–14)

## 2023-11-30 DIAGNOSIS — F10.21 HISTORY OF ALCOHOLISM: ICD-10-CM

## 2023-11-30 DIAGNOSIS — K70.30 ALCOHOLIC CIRRHOSIS OF LIVER WITHOUT ASCITES: ICD-10-CM

## 2023-12-01 RX ORDER — THIAMINE HCL 100 MG
100 TABLET ORAL DAILY
Qty: 90 TABLET | Refills: 3 | OUTPATIENT
Start: 2023-12-01

## 2023-12-01 RX ORDER — FOLIC ACID 1 MG/1
TABLET ORAL
Qty: 90 TABLET | Refills: 3 | OUTPATIENT
Start: 2023-12-01

## 2024-01-08 ENCOUNTER — LAB VISIT (OUTPATIENT)
Dept: LAB | Facility: HOSPITAL | Age: 44
End: 2024-01-08
Payer: MEDICAID

## 2024-01-08 ENCOUNTER — OFFICE VISIT (OUTPATIENT)
Dept: INTERNAL MEDICINE | Facility: CLINIC | Age: 44
End: 2024-01-08
Payer: MEDICAID

## 2024-01-08 VITALS
RESPIRATION RATE: 20 BRPM | OXYGEN SATURATION: 97 % | WEIGHT: 152.63 LBS | TEMPERATURE: 98 F | HEIGHT: 66 IN | DIASTOLIC BLOOD PRESSURE: 86 MMHG | SYSTOLIC BLOOD PRESSURE: 122 MMHG | HEART RATE: 84 BPM | BODY MASS INDEX: 24.53 KG/M2

## 2024-01-08 DIAGNOSIS — K70.30 ALCOHOLIC CIRRHOSIS OF LIVER WITHOUT ASCITES: ICD-10-CM

## 2024-01-08 DIAGNOSIS — K70.30 ALCOHOLIC CIRRHOSIS OF LIVER WITHOUT ASCITES: Primary | ICD-10-CM

## 2024-01-08 LAB
ALBUMIN SERPL-MCNC: 4.2 G/DL (ref 3.5–5)
ALBUMIN/GLOB SERPL: 1.2 RATIO (ref 1.1–2)
ALP SERPL-CCNC: 54 UNIT/L (ref 40–150)
ALT SERPL-CCNC: 16 UNIT/L (ref 0–55)
AST SERPL-CCNC: 17 UNIT/L (ref 5–34)
BASOPHILS # BLD AUTO: 0.04 X10(3)/MCL
BASOPHILS NFR BLD AUTO: 0.7 %
BILIRUB SERPL-MCNC: 0.8 MG/DL
BUN SERPL-MCNC: 8.8 MG/DL (ref 8.9–20.6)
CALCIUM SERPL-MCNC: 9.6 MG/DL (ref 8.4–10.2)
CHLORIDE SERPL-SCNC: 106 MMOL/L (ref 98–107)
CO2 SERPL-SCNC: 26 MMOL/L (ref 22–29)
CREAT SERPL-MCNC: 1.12 MG/DL (ref 0.73–1.18)
EOSINOPHIL # BLD AUTO: 0.16 X10(3)/MCL (ref 0–0.9)
EOSINOPHIL NFR BLD AUTO: 2.6 %
ERYTHROCYTE [DISTWIDTH] IN BLOOD BY AUTOMATED COUNT: 12.6 % (ref 11.5–17)
FOLATE SERPL-MCNC: 13.5 NG/ML (ref 7–31.4)
GFR SERPLBLD CREATININE-BSD FMLA CKD-EPI: >60 MLS/MIN/1.73/M2
GLOBULIN SER-MCNC: 3.4 GM/DL (ref 2.4–3.5)
GLUCOSE SERPL-MCNC: 91 MG/DL (ref 74–100)
HCT VFR BLD AUTO: 49 % (ref 42–52)
HGB BLD-MCNC: 17.1 G/DL (ref 14–18)
IMM GRANULOCYTES # BLD AUTO: 0.01 X10(3)/MCL (ref 0–0.04)
IMM GRANULOCYTES NFR BLD AUTO: 0.2 %
LYMPHOCYTES # BLD AUTO: 1.97 X10(3)/MCL (ref 0.6–4.6)
LYMPHOCYTES NFR BLD AUTO: 32.2 %
MCH RBC QN AUTO: 32 PG (ref 27–31)
MCHC RBC AUTO-ENTMCNC: 34.9 G/DL (ref 33–36)
MCV RBC AUTO: 91.6 FL (ref 80–94)
MONOCYTES # BLD AUTO: 0.43 X10(3)/MCL (ref 0.1–1.3)
MONOCYTES NFR BLD AUTO: 7 %
NEUTROPHILS # BLD AUTO: 3.5 X10(3)/MCL (ref 2.1–9.2)
NEUTROPHILS NFR BLD AUTO: 57.3 %
NRBC BLD AUTO-RTO: 0 %
PLATELET # BLD AUTO: 172 X10(3)/MCL (ref 130–400)
PMV BLD AUTO: 10.6 FL (ref 7.4–10.4)
POTASSIUM SERPL-SCNC: 4.8 MMOL/L (ref 3.5–5.1)
PROT SERPL-MCNC: 7.6 GM/DL (ref 6.4–8.3)
RBC # BLD AUTO: 5.35 X10(6)/MCL (ref 4.7–6.1)
SODIUM SERPL-SCNC: 138 MMOL/L (ref 136–145)
VIT B12 SERPL-MCNC: 602 PG/ML (ref 213–816)
WBC # SPEC AUTO: 6.11 X10(3)/MCL (ref 4.5–11.5)

## 2024-01-08 PROCEDURE — 80053 COMPREHEN METABOLIC PANEL: CPT

## 2024-01-08 PROCEDURE — 99215 OFFICE O/P EST HI 40 MIN: CPT | Mod: PBBFAC

## 2024-01-08 PROCEDURE — 82607 VITAMIN B-12: CPT

## 2024-01-08 PROCEDURE — 85025 COMPLETE CBC W/AUTO DIFF WBC: CPT

## 2024-01-08 PROCEDURE — 82746 ASSAY OF FOLIC ACID SERUM: CPT

## 2024-01-08 PROCEDURE — 36415 COLL VENOUS BLD VENIPUNCTURE: CPT

## 2024-01-08 RX ORDER — TACROLIMUS 1 MG/G
OINTMENT TOPICAL 2 TIMES DAILY
Qty: 30 G | Refills: 1 | Status: SHIPPED | OUTPATIENT
Start: 2024-01-08

## 2024-01-08 NOTE — PROGRESS NOTES
INTERNAL MEDICINE RESIDENT CLINIC  CLINIC NOTE    Patient Name: Dex Ma  YOB: 1980  Chief Complaint: Cirrhosis (Cirrhosis f/u)     PRESENTING HISTORY   History of Present Illness:  Mr. Dex Ma is a 43 y.o. male w/ PMHx of alcoholic cirrhosis and GERD who presents to clinic today for routine follow-up.     Patient reports no acute complaints today and states that he is feeling well overall.  He reports continued abstinence from alcohol.  Last HCC screening ultrasound was conducted on 08/14 and was unchanged from previous.  Last EGD on file in March of 2022.  Patient has upcoming scheduled appointment with Dr. Hernandez in cirrhosis clinic on 2/19.     Review of Systems:  12 point review of symptoms negative unless otherwise stated above    PAST HISTORY:     Past Medical History:   Diagnosis Date    Cirrhosis     GERD (gastroesophageal reflux disease)         Past Surgical History:   Procedure Laterality Date    ADENOIDECTOMY      DENTAL SURGERY      TONSILLECTOMY         Family History   Problem Relation Age of Onset    No Known Problems Mother     No Known Problems Father        Social History     Socioeconomic History    Marital status: Single   Occupational History    Occupation:    Tobacco Use    Smoking status: Every Day     Current packs/day: 0.50     Types: Cigarettes    Smokeless tobacco: Never    Tobacco comments:     States smoked 10 cigarettes a day   Substance and Sexual Activity    Alcohol use: Not Currently     Comment: past use    Drug use: Not Currently     Types: Marijuana     Comment: socially    Sexual activity: Yes     Partners: Female     Social Determinants of Health     Financial Resource Strain: Low Risk  (9/21/2022)    Overall Financial Resource Strain (CARDIA)     Difficulty of Paying Living Expenses: Not hard at all   Food Insecurity: No Food Insecurity (9/21/2022)    Hunger Vital Sign     Worried About Running Out of Food in the Last Year: Never true      Ran Out of Food in the Last Year: Never true   Transportation Needs: No Transportation Needs (9/21/2022)    PRAPARE - Transportation     Lack of Transportation (Medical): No     Lack of Transportation (Non-Medical): No   Physical Activity: Inactive (9/21/2022)    Exercise Vital Sign     Days of Exercise per Week: 0 days     Minutes of Exercise per Session: 0 min   Stress: No Stress Concern Present (9/21/2022)    [employer] - Occupational Stress Questionnaire     Feeling of Stress : Only a little   Social Connections: Socially Isolated (9/21/2022)    Social Connection and Isolation Panel [NHANES]     Frequency of Communication with Friends and Family: Twice a week     Frequency of Social Gatherings with Friends and Family: Once a week     Attends [employer] Services: Never     Active Member of Clubs or Organizations: No     Attends Club or Organization Meetings: Never     Marital Status: Never    Housing Stability: Low Risk  (9/21/2022)    Housing Stability Vital Sign     Unable to Pay for Housing in the Last Year: No     Number of Places Lived in the Last Year: 1     Unstable Housing in the Last Year: No       MEDICATIONS:     Current Outpatient Medications   Medication Instructions    buPROPion (WELLBUTRIN XL) 150 mg, Oral, Daily    dextroamphetamine-amphetamine 10 mg Tab 1 tablet, Oral, 2 times daily    ergocalciferol (ERGOCALCIFEROL) 50,000 unit Cap Oral    EUCERIN PLUS INTENSIVE REPAIR Crea Apply to affected area twice daily    folic acid (FOLVITE) 1 MG tablet <BR>See Instructions, TAKE 1 TABLET BY MOUTH DAILY, # 30 tab(s), 3 Refill(s), Pharmacy: [facility] #[id number], 177, cm, Height/Length Dosing, 01/24/22 15:49:00 CST, 71.1, kg, Weight Dosing, 01/24/22 15:49:00 CST    furosemide (LASIX) 20 MG tablet   See Instructions, TAKE 2 TABLETS BY MOUTH DAILY, # 60 tab(s), 3 Refill(s), Pharmacy: [facility] #[id number], 177, cm, Height/Length Dosing, 01/24/22 15:49:00  "CST, 71.1, kg, Weight Dosing, 01/24/22 15:49:00 CST    gabapentin (NEURONTIN) 600 mg, Oral, 2 times daily    metoprolol succinate (TOPROL-XL) 50 mg, Oral, Every morning    mineral oil-hydrophil petrolat (AQUAPHOR) Oint Topical (Top), As needed (PRN), With tacrolimus    pantoprazole (PROTONIX) 40 mg, Oral, As needed (PRN)    predniSONE (DELTASONE) 20 mg, Oral, 2 times daily    spironolactone (ALDACTONE) 25 MG tablet   See Instructions, TAKE 4 TABLETS BY MOUTH DAILY, # 120 tab(s), 3 Refill(s), Pharmacy: [facility] #[id number], 177, cm, Height/Length Dosing, 01/24/22 15:49:00 Edith FUENTES.1, kg, Weight Dosing, 01/24/22 15:49:00 CST    tacrolimus (PROTOPIC) 0.1 % ointment Topical (Top), 2 times daily    vitamin B complex (VITAMIN B-100 COMPLEX ORAL) TAKE 1 TABLET BY MOUTH ONCE DAILY    VITAMIN B-1 100 mg, Oral, Daily        OBJECTIVE:   Vital Signs:  Vitals:    01/08/24 1231 01/08/24 1235   BP: (!) 147/106 122/86   Pulse: 84    Resp: 20    Temp: 97.6 °F (36.4 °C)    TempSrc: Oral    SpO2: 97%    Weight: 69.2 kg (152 lb 9.6 oz)    Height: 5' 6" (1.676 m)         Physical Exam:  Physical Exam  Vitals reviewed.   Constitutional:       Appearance: Normal appearance.   Cardiovascular:      Rate and Rhythm: Normal rate and regular rhythm.      Pulses: Normal pulses.      Heart sounds: No murmur heard.     No friction rub. No gallop.   Pulmonary:      Breath sounds: Normal breath sounds. No wheezing, rhonchi or rales.   Abdominal:      General: There is no distension.      Palpations: Abdomen is soft. There is no mass.      Tenderness: There is no abdominal tenderness.   Musculoskeletal:         General: No swelling or tenderness.   Skin:     General: Skin is warm and dry.      Comments: Some redness of skin overlying nose   Neurological:      General: No focal deficit present.      Mental Status: He is alert.   Psychiatric:         Mood and Affect: Mood normal.         Behavior: Behavior normal.       Laboratory  Lab Results "   Component Value Date     07/26/2023    K 4.7 07/26/2023    CO2 27 07/26/2023    BUN 11.0 07/26/2023    CREATININE 1.21 (H) 07/26/2023    CALCIUM 9.7 07/26/2023    BILIDIR 3.7 (H) 11/15/2021    IBILI 1.70 (H) 11/15/2021    ALKPHOS 65 07/26/2023    AST 18 07/26/2023    ALT 16 07/26/2023    MG 2.00 10/27/2021    PHOS 2.4 10/29/2021        Lab Results   Component Value Date    WBC 6.57 07/26/2023    RBC 5.25 07/26/2023    HGB 16.9 07/26/2023    HCT 48.3 07/26/2023    MCV 92.0 07/26/2023    MCH 32.2 (H) 07/26/2023    MCHC 35.0 07/26/2023    RDW 12.5 07/26/2023     07/26/2023    MPV 11.1 (H) 07/26/2023        Diagnostic Results:  No results found in the last 30 days.   No results found in the last 24 hours.     ASSESSMENT & PLAN:     -Compensated cirrhotic liver disease:  -Diagnosed during hospital admission on 10/2021   -Patient reports continued EtOH abstinence.   -Patient does not report any recent hospitalizations or symptoms.  -MELD-Na at presentation on 10/2021 was 29; Child-Vu at presentation was Class C  -Patient is to continue to abstain from alcohol use and avoid excessive use of hepatotoxic medication  -Continue to follow cirrhosis clinic with Dr. Hernandez.  Patient has an appointment to/2024  -RUQ US conducted on 08/14/2023 displayed no acute abnormalities; repeat scheduled maintenance for 2/14/2024, ordered  -EGD on 3/28/2022 displayed no abnormalities; contacting GI clinic to schedule follow up appointment as patient is due for 1-2 year variceal screening  -Will repeat labs today to include: CBC, CMP, B12, and folate      -Rosacea of face  -Patient was seen by Dermatology on 5/12/23  -Was prescribed Prednisone 40mg x5d and topical tacrolimus with instructions for Aquaphor BID after steroid course completion.  -At today's appointment patient reports improvement   - instructed patient to contact Dermatology Clinic to schedule follow up appointment as he no showed for most recent scheduled      -ADHD:   -Patient is following Psychiatrist for management.  -Currently taking Adderall and continues to deny side-effects.      -Tobacco use   -Patient reports that he has cut back on cigarette use to <1ppd. He states that he is not yet ready to quit. Spoke to patient about benefits of quitting and discussed resources available when ready to quit.     Health Maintenance/ Wellness    There is no immunization history on file for this patient.     Immunizations: Patient declined vaccines at today's visit  Screening colonoscopy: Will schedule at next appointment in 6 months when patient is 45 y/o  Hepatitis Panel: Up to date    Counseling:  - Patient counselled on smoking cessation  - Educated on diet (portion control) and exercise (at least 30 minutes per day)  - Relevant educational materials provided    Health Maintenance/ Wellness    There is no immunization history on file for this patient.    Medications: reconciled, discussed and refills given.  Follow up in about 6 months (around 7/8/2024).    Wil Harrington DO  01/08/2024, 7:26 AM

## 2024-02-14 ENCOUNTER — HOSPITAL ENCOUNTER (OUTPATIENT)
Dept: RADIOLOGY | Facility: HOSPITAL | Age: 44
Discharge: HOME | End: 2024-02-14
Payer: MEDICAID

## 2024-02-14 DIAGNOSIS — K70.30 ALCOHOLIC CIRRHOSIS OF LIVER WITHOUT ASCITES: ICD-10-CM

## 2024-02-14 PROCEDURE — 76705 ECHO EXAM OF ABDOMEN: CPT | Mod: TC

## 2024-02-19 ENCOUNTER — OFFICE VISIT (OUTPATIENT)
Dept: GASTROENTEROLOGY | Facility: CLINIC | Age: 44
End: 2024-02-19
Payer: MEDICAID

## 2024-02-19 VITALS
SYSTOLIC BLOOD PRESSURE: 130 MMHG | HEIGHT: 66 IN | HEART RATE: 98 BPM | WEIGHT: 152 LBS | TEMPERATURE: 98 F | DIASTOLIC BLOOD PRESSURE: 84 MMHG | BODY MASS INDEX: 24.43 KG/M2 | RESPIRATION RATE: 16 BRPM

## 2024-02-19 DIAGNOSIS — K70.30 ALCOHOLIC CIRRHOSIS OF LIVER WITHOUT ASCITES: Primary | ICD-10-CM

## 2024-02-19 LAB
INR PPP: 1
PROTHROMBIN TIME: 13.5 SECONDS (ref 11.4–14)

## 2024-02-19 PROCEDURE — 36415 COLL VENOUS BLD VENIPUNCTURE: CPT | Performed by: STUDENT IN AN ORGANIZED HEALTH CARE EDUCATION/TRAINING PROGRAM

## 2024-02-19 PROCEDURE — 85610 PROTHROMBIN TIME: CPT | Performed by: STUDENT IN AN ORGANIZED HEALTH CARE EDUCATION/TRAINING PROGRAM

## 2024-02-19 PROCEDURE — 99214 OFFICE O/P EST MOD 30 MIN: CPT | Mod: PBBFAC | Performed by: STUDENT IN AN ORGANIZED HEALTH CARE EDUCATION/TRAINING PROGRAM

## 2024-02-19 NOTE — PROGRESS NOTES
Subjective     Patient ID: Dex Ma is a 43 y.o. male.    Chief Complaint: Alcholic Cirrhosos (No complaints.)    43-year-old male history of alcoholic cirrhosis, previously hospitalized here at our facility 2021, presenting to clinic today for his follow-up appointment.  He was hospitalized in October 2021 with ascites and decompensated cirrhotic liver disease.  Had presented with weakness and abdominal distension have been going on for weeks prior to his admission with poor p.o. intake and decreased appetite.  Previously used to drink 2 pt of liquor a day up until a few months prior to this prior admission when he had decreased his intake.  At prior to his admission was drinking half a pt to 1 pt of liquor a day.  Smokes cigarettes.  Prior cocaine and marijuana use.  But has not used drugs in if years.       Gastroenterology evaluated patient at that time as MELD was 29, Child Vu class C.  Required a paracentesis on that admission that showed SAAG 1.5, total protein less than 0.8 suggesting  portal hypertension.  Of hyponatremic on admission and diuretics were held at that time and required albumin after his paracentesis.  He was placed on Bactrim once daily for SBP prophylaxis.  EGD was not done inpatient.       Patient had been seen by the residents outpatient after discharge his MELD was already improving total bilirubin had decreased from 18 on the hospital admission down to 5.4.  MELD and a clinic visit calculated to be 21 and Child Vu score was down to be.  Had remained absent from alcohol that time.  Reduced swelling distension along with decreased swelling in lower extremities.       Patient seen today, his recent MELD is 5, Child Vu class a of labs done in March 2022.  His EGD was also performed in March 2022 that showed a normal esophagus, stomach and duodenum.  He still needs an updated abdominal ultrasound.  His last imaging performed was a CT of the abdomen done October 2021 that showed  stigmata of cirrhosis with portal hypertension diffuse body wall edema and bilateral pleural effusions as well.  He needs refills on his folate, B12, lactulose.     He denies any melena, hematochezia, hematemesis.  Denies any jaundice or abdominal swelling or lower extremity swelling.     8/7/23:Continues to abstain from alcohol.  Most updated abdominal ultrasound August 2022 revealing only hepatic steatosis, no masses or lesions present.  Off diuretics at this point.  Denies any melena, hematochezia, jaundice.    No complaints, recently saw his new PCP 2 weeks ago, updated MELD labs at that time revealed MELD 9, Child Vu class a.  HCC screening performed 2/9/2 3 reveals no hepatic lesions.  Denies any melena, hematochezia, jaundice, hematemesis.    Today's visit:  No complaints today, had metabolic panel done a month ago needs INR today to complete full course of MELD labs.  Up-to-date on HCC screening performed last week, revealed no evidence of hepatic lesions.  Endorsing 1 bowel movement per day, soft in nature.  Denies any melena, hematochezia, jaundice, hematemesis and no other hospital stays.  Still smokes half a pack per day of cigarettes.      Review of Systems   Constitutional: Negative.    HENT: Negative.     Eyes: Negative.    Respiratory: Negative.     Cardiovascular: Negative.    Gastrointestinal: Negative.    Endocrine: Negative.    Genitourinary: Negative.    Musculoskeletal: Negative.    Allergic/Immunologic: Negative.    Neurological: Negative.    Hematological: Negative.    Psychiatric/Behavioral: Negative.            Objective   Vitals:    02/19/24 1433   BP: 130/84   Pulse:    Resp:    Temp:          Physical Exam  Constitutional:       Appearance: Normal appearance. He is normal weight.   HENT:      Head: Normocephalic and atraumatic.      Mouth/Throat:      Mouth: Mucous membranes are moist.      Pharynx: Oropharynx is clear.   Eyes:      Conjunctiva/sclera: Conjunctivae normal.      Pupils:  Pupils are equal, round, and reactive to light.   Cardiovascular:      Rate and Rhythm: Normal rate and regular rhythm.      Pulses: Normal pulses.      Heart sounds: Normal heart sounds.   Pulmonary:      Effort: Pulmonary effort is normal.      Breath sounds: Normal breath sounds.   Abdominal:      General: Abdomen is flat. Bowel sounds are normal.   Musculoskeletal:         General: Normal range of motion.   Skin:     General: Skin is warm and dry.   Neurological:      General: No focal deficit present.      Mental Status: He is alert and oriented to person, place, and time. Mental status is at baseline.          Assessment and Plan     1. Alcoholic cirrhosis of liver without ascites  -     Protime-INR  -     US Abdomen Limited_Liver; Future; Expected date: 08/19/2024        Background:  Alcohol:  Yes, in the past     Tobacco:  Cigarettes, 1/2 ppd     Liver disease:  Alcohol     MELD-Na:  8, repeating INR today to update  Child-Vu Class: A     Transplant: not under evaluation, low MELD     Cirrhosis is decompensated with:     Ascites:  In the past yes, in his October 2021 admission went been on Lasix 40 mg Aldactone 100 mg previously; paracentesis performed October 2021 revealed SANDRO G1 0.5, total protein less than 0.8, suggestive of portal hypertension.  Spontaneous bacterial peritonitis: No  Hepatic Encephalopathy: No  Hepatocellular carcinoma: No  Hepatorenal syndrome: No  Hyponatremia: No  Muscle wasting: No  Portal vein thrombosis: No     Screening:  Last EGD:  March 2022:  Normal esophagus, stomach, duodenum.        Last imaging study:  Abdominal ultrasound 02/14/2024: No hepatic lesions seen.  Otherwise unremarkable     CIRRHOSIS COUNSELING:  - strict abstinence of alcohol use  - low sodium (salt) 2000mg per day  - Nutrition: 25-30kcal (calorie per body weight in kilogram) per day  - No need to restrict protein in diet  - High protein diet: 1.2-1.5 gram/kg (protein per body weight in kg) per day to  prevent muscle mass loss  - Resistance exercises for muscle strength  - Avoid raw seafoods due to risk of fatal Vibrio vulnificus infection  - Avoid Non-steroidal anti-inflammatory drugs (NSAIDs) such as ibuprofen, Motrin, naproxen, Aleve due to risk of kidney damage  - Can take acetaminophen (tylenol), no more than 2000mg per day  - Compliance with all medications  - Ultrasound or MRI of the liver every 6 months for liver cancer screening  - Upper endoscopy every 1-2 years to screen for varices         Six-month follow-up, we will need INR completed today to updated MELD labs.    Next abdominal ultrasound for HCC screening due August 2024.  Follow up in about 6 months (around 8/19/2024).

## 2024-07-22 ENCOUNTER — LAB VISIT (OUTPATIENT)
Dept: LAB | Facility: HOSPITAL | Age: 44
End: 2024-07-22
Payer: MEDICAID

## 2024-07-22 ENCOUNTER — OFFICE VISIT (OUTPATIENT)
Dept: INTERNAL MEDICINE | Facility: CLINIC | Age: 44
End: 2024-07-22
Payer: MEDICAID

## 2024-07-22 ENCOUNTER — TELEPHONE (OUTPATIENT)
Dept: INTERNAL MEDICINE | Facility: CLINIC | Age: 44
End: 2024-07-22
Payer: MEDICAID

## 2024-07-22 VITALS
RESPIRATION RATE: 18 BRPM | SYSTOLIC BLOOD PRESSURE: 104 MMHG | DIASTOLIC BLOOD PRESSURE: 72 MMHG | BODY MASS INDEX: 24.81 KG/M2 | OXYGEN SATURATION: 98 % | HEIGHT: 66 IN | TEMPERATURE: 98 F | WEIGHT: 154.38 LBS | HEART RATE: 76 BPM

## 2024-07-22 DIAGNOSIS — K70.30 ALCOHOLIC CIRRHOSIS OF LIVER WITHOUT ASCITES: ICD-10-CM

## 2024-07-22 DIAGNOSIS — F90.2 ATTENTION DEFICIT HYPERACTIVITY DISORDER (ADHD), COMBINED TYPE: Primary | ICD-10-CM

## 2024-07-22 LAB
25(OH)D3+25(OH)D2 SERPL-MCNC: 26 NG/ML (ref 30–80)
ALBUMIN SERPL-MCNC: 4.1 G/DL (ref 3.5–5)
ALBUMIN/GLOB SERPL: 1.3 RATIO (ref 1.1–2)
ALP SERPL-CCNC: 59 UNIT/L (ref 40–150)
ALT SERPL-CCNC: 21 UNIT/L (ref 0–55)
ANION GAP SERPL CALC-SCNC: 7 MEQ/L
APTT PPP: 29 SECONDS (ref 23.2–33.7)
AST SERPL-CCNC: 20 UNIT/L (ref 5–34)
BASOPHILS # BLD AUTO: 0.04 X10(3)/MCL
BASOPHILS NFR BLD AUTO: 0.7 %
BILIRUB SERPL-MCNC: 0.6 MG/DL
BUN SERPL-MCNC: 8.4 MG/DL (ref 8.9–20.6)
CALCIUM SERPL-MCNC: 9.6 MG/DL (ref 8.4–10.2)
CHLORIDE SERPL-SCNC: 106 MMOL/L (ref 98–107)
CHOLEST SERPL-MCNC: 162 MG/DL
CHOLEST/HDLC SERPL: 3 {RATIO} (ref 0–5)
CO2 SERPL-SCNC: 27 MMOL/L (ref 22–29)
CREAT SERPL-MCNC: 1.3 MG/DL (ref 0.73–1.18)
CREAT/UREA NIT SERPL: 6
EOSINOPHIL # BLD AUTO: 0.21 X10(3)/MCL (ref 0–0.9)
EOSINOPHIL NFR BLD AUTO: 3.7 %
ERYTHROCYTE [DISTWIDTH] IN BLOOD BY AUTOMATED COUNT: 12.4 % (ref 11.5–17)
EST. AVERAGE GLUCOSE BLD GHB EST-MCNC: 93.9 MG/DL
FOLATE SERPL-MCNC: 9.6 NG/ML (ref 7–31.4)
GFR SERPLBLD CREATININE-BSD FMLA CKD-EPI: >60 ML/MIN/1.73/M2
GLOBULIN SER-MCNC: 3.2 GM/DL (ref 2.4–3.5)
GLUCOSE SERPL-MCNC: 88 MG/DL (ref 74–100)
HBA1C MFR BLD: 4.9 %
HCT VFR BLD AUTO: 47.4 % (ref 42–52)
HDLC SERPL-MCNC: 49 MG/DL (ref 35–60)
HGB BLD-MCNC: 16.7 G/DL (ref 14–18)
IMM GRANULOCYTES # BLD AUTO: 0.01 X10(3)/MCL (ref 0–0.04)
IMM GRANULOCYTES NFR BLD AUTO: 0.2 %
INR PPP: 1
LDLC SERPL CALC-MCNC: 95 MG/DL (ref 50–140)
LYMPHOCYTES # BLD AUTO: 2.36 X10(3)/MCL (ref 0.6–4.6)
LYMPHOCYTES NFR BLD AUTO: 41.2 %
MCH RBC QN AUTO: 32.8 PG (ref 27–31)
MCHC RBC AUTO-ENTMCNC: 35.2 G/DL (ref 33–36)
MCV RBC AUTO: 93.1 FL (ref 80–94)
MONOCYTES # BLD AUTO: 0.43 X10(3)/MCL (ref 0.1–1.3)
MONOCYTES NFR BLD AUTO: 7.5 %
NEUTROPHILS # BLD AUTO: 2.68 X10(3)/MCL (ref 2.1–9.2)
NEUTROPHILS NFR BLD AUTO: 46.7 %
NRBC BLD AUTO-RTO: 0 %
PLATELET # BLD AUTO: 163 X10(3)/MCL (ref 130–400)
PMV BLD AUTO: 10.7 FL (ref 7.4–10.4)
POTASSIUM SERPL-SCNC: 4.2 MMOL/L (ref 3.5–5.1)
PROT SERPL-MCNC: 7.3 GM/DL (ref 6.4–8.3)
PROTHROMBIN TIME: 13.5 SECONDS (ref 11.4–14)
RBC # BLD AUTO: 5.09 X10(6)/MCL (ref 4.7–6.1)
SODIUM SERPL-SCNC: 140 MMOL/L (ref 136–145)
TRIGL SERPL-MCNC: 92 MG/DL (ref 34–140)
VIT B12 SERPL-MCNC: 529 PG/ML (ref 213–816)
VLDLC SERPL CALC-MCNC: 18 MG/DL
WBC # BLD AUTO: 5.73 X10(3)/MCL (ref 4.5–11.5)

## 2024-07-22 PROCEDURE — 36415 COLL VENOUS BLD VENIPUNCTURE: CPT

## 2024-07-22 PROCEDURE — 82306 VITAMIN D 25 HYDROXY: CPT

## 2024-07-22 PROCEDURE — 85730 THROMBOPLASTIN TIME PARTIAL: CPT

## 2024-07-22 PROCEDURE — 99214 OFFICE O/P EST MOD 30 MIN: CPT | Mod: PBBFAC

## 2024-07-22 PROCEDURE — 85610 PROTHROMBIN TIME: CPT

## 2024-07-22 PROCEDURE — 80053 COMPREHEN METABOLIC PANEL: CPT

## 2024-07-22 PROCEDURE — 83036 HEMOGLOBIN GLYCOSYLATED A1C: CPT

## 2024-07-22 PROCEDURE — 82607 VITAMIN B-12: CPT

## 2024-07-22 PROCEDURE — 82746 ASSAY OF FOLIC ACID SERUM: CPT

## 2024-07-22 PROCEDURE — 80061 LIPID PANEL: CPT

## 2024-07-22 PROCEDURE — 85025 COMPLETE CBC W/AUTO DIFF WBC: CPT

## 2024-07-22 RX ORDER — CHOLECALCIFEROL (VITAMIN D3) 25 MCG
1000 TABLET ORAL DAILY
COMMUNITY

## 2024-07-22 NOTE — PROGRESS NOTES
INTERNAL MEDICINE RESIDENT  CLINIC NOTE    Patient Name: Dex Ma  YOB: 1980  Chief Complaint: Cirrhosis (Alcoholic cirrhosis of liver without ascites//)     PRESENTING HISTORY   History of Present Illness:  Mr. Dex Ma is a 44 y.o. male w/ PMHx of alcoholic cirrhosis and GERD who presents to clinic today for routine follow-up.     Patient reports no acute complaints today and states that he is feeling well overall.  He reports continued abstinence from alcohol.   Patient has upcoming scheduled appointment with Dr. Hernandez in cirrhosis.     Patient states that he was recently prescribed Wellbutrin by outside provider in about.  Patient states that this is improved his symptoms.     Review of Systems:  12 point review of symptoms negative unless otherwise stated above    PAST HISTORY:     Past Medical History:   Diagnosis Date    Cirrhosis     GERD (gastroesophageal reflux disease)         Past Surgical History:   Procedure Laterality Date    ADENOIDECTOMY      DENTAL SURGERY      TONSILLECTOMY         Family History   Problem Relation Name Age of Onset    No Known Problems Mother      No Known Problems Father         Social History     Socioeconomic History    Marital status: Single   Occupational History    Occupation:    Tobacco Use    Smoking status: Every Day     Current packs/day: 0.50     Types: Cigarettes    Smokeless tobacco: Never    Tobacco comments:     States smoked 10 cigarettes a day   Substance and Sexual Activity    Alcohol use: Not Currently     Comment: past use    Drug use: Not Currently     Types: Marijuana     Comment: socially    Sexual activity: Yes     Partners: Female     Social Determinants of Health     Financial Resource Strain: Low Risk  (7/19/2024)    Overall Financial Resource Strain (CARDIA)     Difficulty of Paying Living Expenses: Not hard at all   Food Insecurity: No Food Insecurity (7/19/2024)    Hunger Vital Sign     Worried About Running  Out of Food in the Last Year: Never true     Ran Out of Food in the Last Year: Never true   Transportation Needs: No Transportation Needs (9/21/2022)    PRAPARE - Transportation     Lack of Transportation (Medical): No     Lack of Transportation (Non-Medical): No   Physical Activity: Inactive (7/19/2024)    Exercise Vital Sign     Days of Exercise per Week: 0 days     Minutes of Exercise per Session: 0 min   Stress: Stress Concern Present (7/19/2024)    [facility] [employer] - Occupational Stress Questionnaire     Feeling of Stress : To some extent   Housing Stability: Low Risk  (9/21/2022)    Housing Stability Vital Sign     Unable to Pay for Housing in the Last Year: No     Number of Places Lived in the Last Year: 1     Unstable Housing in the Last Year: No       MEDICATIONS:     Current Outpatient Medications   Medication Instructions    buPROPion (WELLBUTRIN XL) 150 mg, Oral, Daily    dextroamphetamine-amphetamine 10 mg Tab 1 tablet, Oral, 2 times daily    ergocalciferol (ERGOCALCIFEROL) 50,000 unit Cap Take by mouth.    EUCERIN PLUS INTENSIVE REPAIR Crea Apply to affected area twice daily    folic acid (FOLVITE) 1 MG tablet <BR>See Instructions, TAKE 1 TABLET BY MOUTH DAILY, # 30 tab(s), 3 Refill(s), Pharmacy: [facility] #[id number], 177, cm, Height/Length Dosing, 01/24/22 15:49:00 CST, 71.1, kg, Weight Dosing, 01/24/22 15:49:00 CST    furosemide (LASIX) 20 MG tablet   See Instructions, TAKE 2 TABLETS BY MOUTH DAILY, # 60 tab(s), 3 Refill(s), Pharmacy: [facility] #[id number], 177, cm, Height/Length Dosing, 01/24/22 15:49:00 CST, 71.1, kg, Weight Dosing, 01/24/22 15:49:00 CST    gabapentin (NEURONTIN) 600 mg, 2 times daily    metoprolol succinate (TOPROL-XL) 50 mg, Every morning    mineral oil-hydrophil petrolat (AQUAPHOR) Oint Topical (Top), As needed (PRN), With tacrolimus    pantoprazole (PROTONIX) 40 mg, As needed (PRN)    predniSONE (DELTASONE) 20 mg, Oral, 2 times daily     "spironolactone (ALDACTONE) 25 MG tablet   See Instructions, TAKE 4 TABLETS BY MOUTH DAILY, # 120 tab(s), 3 Refill(s), Pharmacy: [facility] #[id number], 177, cm, Height/Length Dosing, 01/24/22 15:49:00 CST, 71.1, kg, Weight Dosing, 01/24/22 15:49:00 CST    tacrolimus (PROTOPIC) 0.1 % ointment Topical (Top), 2 times daily    vitamin B complex (VITAMIN B-100 COMPLEX ORAL) TAKE 1 TABLET BY MOUTH ONCE DAILY    VITAMIN B-1 100 mg, Oral, Daily    vitamin D (VITAMIN D3) 1,000 Units, Oral, Daily        OBJECTIVE:   Vital Signs:  Vitals:    07/22/24 0853 07/22/24 0909   BP: (!) 144/93 104/72   Pulse: 76    Resp: 18    Temp: 98.1 °F (36.7 °C)    TempSrc: Oral    SpO2: 98%    Weight: 70 kg (154 lb 6.4 oz)    Height: 5' 6" (1.676 m)           Physical Exam:  Physical Exam  Vitals reviewed.   Constitutional:       Appearance: Normal appearance.   Cardiovascular:      Rate and Rhythm: Normal rate and regular rhythm.      Pulses: Normal pulses.      Heart sounds: No murmur heard.     No friction rub. No gallop.   Pulmonary:      Breath sounds: Normal breath sounds. No wheezing, rhonchi or rales.   Abdominal:      General: There is no distension.      Palpations: Abdomen is soft. There is no mass.      Tenderness: There is no abdominal tenderness.   Musculoskeletal:         General: No swelling or tenderness.   Skin:     General: Skin is warm and dry.   Neurological:      General: No focal deficit present.      Mental Status: He is alert.   Psychiatric:         Mood and Affect: Mood normal.         Behavior: Behavior normal.       Laboratory  Lab Results   Component Value Date     01/08/2024    K 4.8 01/08/2024     01/08/2024    CO2 26 01/08/2024    BUN 8.8 (L) 01/08/2024    CREATININE 1.12 01/08/2024    CALCIUM 9.6 01/08/2024    BILIDIR 3.7 (H) 11/15/2021    IBILI 1.70 (H) 11/15/2021    ALKPHOS 54 01/08/2024    AST 17 01/08/2024    ALT 16 01/08/2024    MG 2.00 10/27/2021    PHOS 2.4 10/29/2021        Lab Results "   Component Value Date    WBC 6.11 01/08/2024    RBC 5.35 01/08/2024    HGB 17.1 01/08/2024    HCT 49.0 01/08/2024    MCV 91.6 01/08/2024    MCH 32.0 (H) 01/08/2024    MCHC 34.9 01/08/2024    RDW 12.6 01/08/2024     01/08/2024    MPV 10.6 (H) 01/08/2024        Diagnostic Results:  No results found in the last 30 days.   No results found in the last 24 hours.     ASSESSMENT & PLAN:     -Compensated cirrhotic liver disease:  -Diagnosed during hospital admission on 10/2021   -Patient reports continued EtOH abstinence.   -Patient does not report any recent hospitalizations or symptoms.  -MELD-Na at presentation on 10/2021 was 29; Child-Vu at presentation was Class C  -Patient is to continue to abstain from alcohol use and avoid excessive use of hepatotoxic medication  -Continue to follow cirrhosis clinic with Dr. Hernandez.   -RUQ US conducted on 08/14/2023 displayed no acute abnormalities; repeat scheduled  -EGD on 3/28/2022 displayed no abnormalities; due for 2 year variceal screening  -Will repeat labs today to include: CBC, CMP, B12, and folate      -Rosacea of face  -Patient was seen by Dermatology on 5/12/23  -Was prescribed Prednisone 40mg x5d and topical tacrolimus with instructions for Aquaphor BID after steroid course completion.  -Improved     -ADHD:   -Patient is following Psychiatrist for management.  -Currently taking Adderall and continues to deny side-effects.      -Tobacco use   -Patient reports that he has cut back on cigarette use to <1ppd. He states that he is not yet ready to quit. Spoke to patient about benefits of quitting and discussed resources available when ready to quit.     Health Maintenance/ Wellness  There is no immunization history on file for this patient.     Immunizations: Patient declined vaccines at today's visit  Colon cancer screening: Patient opting for Cologaurd, orderded  Hepatitis Panel: Up to date    Counseling:  - Patient counselled on smoking cessation  - Educated on  diet (portion control) and exercise (at least 30 minutes per day)  - Relevant educational materials provided    Health Maintenance/ Wellness  There is no immunization history on file for this patient.    Medications: reconciled, discussed and refills given.  Follow up in about 6 months (around 1/22/2025).    Wil Harrington DO  07/22/2024

## 2024-07-22 NOTE — PROGRESS NOTES
Faculty addendum:     I have reviewed and concur with the resident's history, physical, assessment, and plan.  I have discussed with him all issues related to the diagnosis, workup and treatment plan.Care provided as reasonable and necessary.

## 2024-08-19 ENCOUNTER — OFFICE VISIT (OUTPATIENT)
Dept: GASTROENTEROLOGY | Facility: CLINIC | Age: 44
End: 2024-08-19
Payer: MEDICAID

## 2024-08-19 VITALS
DIASTOLIC BLOOD PRESSURE: 80 MMHG | HEIGHT: 66 IN | HEART RATE: 87 BPM | BODY MASS INDEX: 24.75 KG/M2 | SYSTOLIC BLOOD PRESSURE: 128 MMHG | TEMPERATURE: 98 F | RESPIRATION RATE: 16 BRPM | OXYGEN SATURATION: 99 % | WEIGHT: 154 LBS

## 2024-08-19 DIAGNOSIS — K70.30 ALCOHOLIC CIRRHOSIS OF LIVER WITHOUT ASCITES: Primary | ICD-10-CM

## 2024-08-19 PROCEDURE — 99214 OFFICE O/P EST MOD 30 MIN: CPT | Mod: PBBFAC | Performed by: STUDENT IN AN ORGANIZED HEALTH CARE EDUCATION/TRAINING PROGRAM

## 2024-08-19 RX ORDER — BUPROPION HYDROCHLORIDE 150 MG/1
150 TABLET, EXTENDED RELEASE ORAL 2 TIMES DAILY
COMMUNITY
Start: 2024-07-24

## 2024-08-19 NOTE — PROGRESS NOTES
Subjective     Patient ID: Dex Ma is a 44 y.o. male.    Chief Complaint: Cirrhosis (No complaints.)    43-year-old male history of alcoholic cirrhosis, previously hospitalized here at our facility 2021, presenting to clinic today for his follow-up appointment.  He was hospitalized in October 2021 with ascites and decompensated cirrhotic liver disease.  Had presented with weakness and abdominal distension have been going on for weeks prior to his admission with poor p.o. intake and decreased appetite.  Previously used to drink 2 pt of liquor a day up until a few months prior to this prior admission when he had decreased his intake.  At prior to his admission was drinking half a pt to 1 pt of liquor a day.  Smokes cigarettes.  Prior cocaine and marijuana use.  But has not used drugs in if years.       Gastroenterology evaluated patient at that time as MELD was 29, Child Vu class C.  Required a paracentesis on that admission that showed SAAG 1.5, total protein less than 0.8 suggesting  portal hypertension.  Of hyponatremic on admission and diuretics were held at that time and required albumin after his paracentesis.  He was placed on Bactrim once daily for SBP prophylaxis.  EGD was not done inpatient.       Patient had been seen by the residents outpatient after discharge his MELD was already improving total bilirubin had decreased from 18 on the hospital admission down to 5.4.  MELD and a clinic visit calculated to be 21 and Child Vu score was down to be.  Had remained absent from alcohol that time.  Reduced swelling distension along with decreased swelling in lower extremities.       Patient seen today, his recent MELD is 5, Child Vu class a of labs done in March 2022.  His EGD was also performed in March 2022 that showed a normal esophagus, stomach and duodenum.  He still needs an updated abdominal ultrasound.  His last imaging performed was a CT of the abdomen done October 2021 that showed stigmata  of cirrhosis with portal hypertension diffuse body wall edema and bilateral pleural effusions as well.  He needs refills on his folate, B12, lactulose.     He denies any melena, hematochezia, hematemesis.  Denies any jaundice or abdominal swelling or lower extremity swelling.     8/7/23:Continues to abstain from alcohol.  Most updated abdominal ultrasound August 2022 revealing only hepatic steatosis, no masses or lesions present.  Off diuretics at this point.  Denies any melena, hematochezia, jaundice.    No complaints, recently saw his new PCP 2 weeks ago, updated MELD labs at that time revealed MELD 9, Child Vu class a.  HCC screening performed 2/9/2 3 reveals no hepatic lesions.  Denies any melena, hematochezia, jaundice, hematemesis.     2/19/24:  No complaints today, had metabolic panel done a month ago needs INR today to complete full course of MELD labs.  Up-to-date on HCC screening performed last week, revealed no evidence of hepatic lesions.  Endorsing 1 bowel movement per day, soft in nature.  Denies any melena, hematochezia, jaundice, hematemesis and no other hospital stays.  Still smokes half a pack per day of cigarettes.    Today's visit:  No complaints today, did miss his HCC screening that was scheduled this month however is already being rescheduled for next month.  Still smoking half a pack of cigarettes per day but recently started on Wellbutrin a combative help with this issue, however he states since taking Wellbutrin has been having night sweats.  Endorses 1 bowel movement per day without straining.  Denies melena hematochezia jaundice hematemesis.  No alcohol since 2021.  Last EGD March 22 2 was normal in the ED/S/D we will be due for updated EGD.  MELD labs up-to-date.  Colon cancer screening ordered by PCP still needs to be performed.  Review of Systems   Constitutional: Negative.    HENT: Negative.     Eyes: Negative.    Respiratory: Negative.     Cardiovascular: Negative.     Gastrointestinal: Negative.    Endocrine: Negative.    Genitourinary: Negative.    Musculoskeletal: Negative.    Allergic/Immunologic: Negative.    Neurological: Negative.    Hematological: Negative.    Psychiatric/Behavioral: Negative.          Objective   Vitals:    08/19/24 1345   BP: 128/80   Pulse: 87   Resp: 16   Temp: 97.7 °F (36.5 °C)         Physical Exam  Constitutional:       Appearance: Normal appearance. He is obese.   HENT:      Head: Normocephalic and atraumatic.      Mouth/Throat:      Mouth: Mucous membranes are moist.      Pharynx: Oropharynx is clear.   Eyes:      Conjunctiva/sclera: Conjunctivae normal.      Pupils: Pupils are equal, round, and reactive to light.   Cardiovascular:      Rate and Rhythm: Normal rate and regular rhythm.      Pulses: Normal pulses.      Heart sounds: Normal heart sounds.   Pulmonary:      Effort: Pulmonary effort is normal.      Breath sounds: Normal breath sounds.   Abdominal:      General: Abdomen is flat. Bowel sounds are normal.      Palpations: Abdomen is soft.   Skin:     General: Skin is warm and dry.   Neurological:      General: No focal deficit present.      Mental Status: He is alert and oriented to person, place, and time. Mental status is at baseline.        Assessment and Plan     1. Alcoholic cirrhosis of liver without ascites        Background:  Alcohol:  Yes, in the past     Tobacco:  Cigarettes, 1/2 ppd     Liver disease:  Alcohol     MELD-Na:  6  Child-Vu Class: 5A     Transplant: not under evaluation, low MELD     Cirrhosis is decompensated with:     Ascites:  In the past yes, in his October 2021 admission went been on Lasix 40 mg Aldactone 100 mg previously; paracentesis performed October 2021 revealed SANDRO G1 0.5, total protein less than 0.8, suggestive of portal hypertension.  Spontaneous bacterial peritonitis: No  Hepatic Encephalopathy: No  Hepatocellular carcinoma: No  Hepatorenal syndrome: No  Hyponatremia: No  Muscle wasting: No  Portal  vein thrombosis: No     Screening:  Last EGD:  March 2022:  Normal esophagus, stomach, duodenum.        Last imaging study:  Abdominal ultrasound 02/14/2024: No hepatic lesions seen.  Otherwise unremarkable     CIRRHOSIS COUNSELING:  - strict abstinence of alcohol use  - low sodium (salt) 2000mg per day  - Nutrition: 25-30kcal (calorie per body weight in kilogram) per day  - No need to restrict protein in diet  - High protein diet: 1.2-1.5 gram/kg (protein per body weight in kg) per day to prevent muscle mass loss  - Resistance exercises for muscle strength  - Avoid raw seafoods due to risk of fatal Vibrio vulnificus infection  - Avoid Non-steroidal anti-inflammatory drugs (NSAIDs) such as ibuprofen, Motrin, naproxen, Aleve due to risk of kidney damage  - Can take acetaminophen (tylenol), no more than 2000mg per day  - Compliance with all medications  - Ultrasound or MRI of the liver every 6 months for liver cancer screening  - Upper endoscopy every 1-2 years to screen for varices      HCC screening to be updated next month September 20, 2024.  Six-month follow-up regardless.  MELD labs up-to-date.  Variceal screening to be updated as well.         Follow up in about 6 months (around 2/19/2025).

## 2025-02-20 ENCOUNTER — TELEPHONE (OUTPATIENT)
Dept: HEPATOLOGY | Facility: HOSPITAL | Age: 45
End: 2025-02-20
Payer: MEDICAID

## 2025-02-20 ENCOUNTER — PATIENT MESSAGE (OUTPATIENT)
Dept: GASTROENTEROLOGY | Facility: CLINIC | Age: 45
End: 2025-02-20
Payer: MEDICAID

## 2025-02-20 DIAGNOSIS — R19.5 POSITIVE COLORECTAL CANCER SCREENING USING COLOGUARD TEST: Primary | ICD-10-CM

## 2025-02-20 RX ORDER — POLYETHYLENE GLYCOL 3350, SODIUM SULFATE, SODIUM CHLORIDE, POTASSIUM CHLORIDE, SODIUM ASCORBATE, AND ASCORBIC ACID 7.5-2.691G
KIT ORAL
Qty: 1 KIT | Refills: 0 | Status: SHIPPED | OUTPATIENT
Start: 2025-02-20

## 2025-02-20 NOTE — TELEPHONE ENCOUNTER
----- Message from Aaron Murphy MD sent at 2/19/2025  7:49 PM CST -----  Regarding: Positive Cologuard  Dejan Harrington,Mr. Ma has a positive cologuard. He also has an EGD scheduled next week. Would you mind calling him and discussing this. If he agrees to a colonoscopy then we may want to reach out to GI prior to the EGD to see if they need to re-schedule him to a day where he can get both done.    Spoke to patient about positive cologaurd. He is willing to complete coloscopy with upcoming EGD. Spoke to GI lab about this. They will add for a double.

## 2025-02-24 ENCOUNTER — OFFICE VISIT (OUTPATIENT)
Dept: GASTROENTEROLOGY | Facility: CLINIC | Age: 45
End: 2025-02-24
Payer: MEDICAID

## 2025-02-24 VITALS
SYSTOLIC BLOOD PRESSURE: 130 MMHG | DIASTOLIC BLOOD PRESSURE: 85 MMHG | BODY MASS INDEX: 25.55 KG/M2 | TEMPERATURE: 98 F | HEIGHT: 66 IN | RESPIRATION RATE: 16 BRPM | HEART RATE: 82 BPM | WEIGHT: 159 LBS | OXYGEN SATURATION: 98 %

## 2025-02-24 DIAGNOSIS — K70.30 ALCOHOLIC CIRRHOSIS OF LIVER WITHOUT ASCITES: Primary | ICD-10-CM

## 2025-02-24 LAB
ALBUMIN SERPL-MCNC: 4.4 G/DL (ref 3.5–5)
ALBUMIN/GLOB SERPL: 1.2 RATIO (ref 1.1–2)
ALP SERPL-CCNC: 54 UNIT/L (ref 40–150)
ALT SERPL-CCNC: 17 UNIT/L (ref 0–55)
ANION GAP SERPL CALC-SCNC: 10 MEQ/L
AST SERPL-CCNC: 19 UNIT/L (ref 5–34)
BILIRUB SERPL-MCNC: 0.6 MG/DL
BUN SERPL-MCNC: 12.7 MG/DL (ref 8.9–20.6)
CALCIUM SERPL-MCNC: 9.6 MG/DL (ref 8.4–10.2)
CHLORIDE SERPL-SCNC: 104 MMOL/L (ref 98–107)
CO2 SERPL-SCNC: 28 MMOL/L (ref 22–29)
CREAT SERPL-MCNC: 1.09 MG/DL (ref 0.72–1.25)
CREAT/UREA NIT SERPL: 12
GFR SERPLBLD CREATININE-BSD FMLA CKD-EPI: >60 ML/MIN/1.73/M2
GLOBULIN SER-MCNC: 3.6 GM/DL (ref 2.4–3.5)
GLUCOSE SERPL-MCNC: 85 MG/DL (ref 74–100)
INR PPP: 1
POTASSIUM SERPL-SCNC: 3.8 MMOL/L (ref 3.5–5.1)
PROT SERPL-MCNC: 8 GM/DL (ref 6.4–8.3)
PROTHROMBIN TIME: 13.7 SECONDS (ref 11.4–14)
SODIUM SERPL-SCNC: 142 MMOL/L (ref 136–145)

## 2025-02-24 PROCEDURE — 99215 OFFICE O/P EST HI 40 MIN: CPT | Mod: PBBFAC | Performed by: STUDENT IN AN ORGANIZED HEALTH CARE EDUCATION/TRAINING PROGRAM

## 2025-02-24 PROCEDURE — 85610 PROTHROMBIN TIME: CPT | Performed by: STUDENT IN AN ORGANIZED HEALTH CARE EDUCATION/TRAINING PROGRAM

## 2025-02-24 PROCEDURE — 36415 COLL VENOUS BLD VENIPUNCTURE: CPT | Performed by: STUDENT IN AN ORGANIZED HEALTH CARE EDUCATION/TRAINING PROGRAM

## 2025-02-24 PROCEDURE — 80053 COMPREHEN METABOLIC PANEL: CPT | Performed by: STUDENT IN AN ORGANIZED HEALTH CARE EDUCATION/TRAINING PROGRAM

## 2025-02-24 RX ORDER — BUPROPION HYDROCHLORIDE 100 MG/1
100 TABLET ORAL 2 TIMES DAILY
COMMUNITY
Start: 2024-12-04

## 2025-02-24 RX ORDER — DEXTROAMPHETAMINE SACCHARATE, AMPHETAMINE ASPARTATE, DEXTROAMPHETAMINE SULFATE AND AMPHETAMINE SULFATE 5; 5; 5; 5 MG/1; MG/1; MG/1; MG/1
1 TABLET ORAL 2 TIMES DAILY
COMMUNITY
Start: 2024-12-19

## 2025-02-24 NOTE — PROGRESS NOTES
Subjective     Patient ID: Dex Ma is a 44 y.o. male.    Chief Complaint: Alcoholic Cirrhosis (No complaints.)    44-year-old male history of alcoholic cirrhosis, previously hospitalized here at our facility 2021, presenting to clinic today for his follow-up appointment.  He was hospitalized in October 2021 with ascites and decompensated cirrhotic liver disease.  Had presented with weakness and abdominal distension have been going on for weeks prior to his admission with poor p.o. intake and decreased appetite.  Previously used to drink 2 pt of liquor a day up until a few months prior to this prior admission when he had decreased his intake.  At prior to his admission was drinking half a pt to 1 pt of liquor a day.  Smokes cigarettes.  Prior cocaine and marijuana use.  But has not used drugs in if years.       Gastroenterology evaluated patient at that time as MELD was 29, Child Vu class C.  Required a paracentesis on that admission that showed SAAG 1.5, total protein less than 0.8 suggesting  portal hypertension.  Of hyponatremic on admission and diuretics were held at that time and required albumin after his paracentesis.  He was placed on Bactrim once daily for SBP prophylaxis.  EGD was not done inpatient.       Patient had been seen by the residents outpatient after discharge his MELD was already improving total bilirubin had decreased from 18 on the hospital admission down to 5.4.  MELD and a clinic visit calculated to be 21 and Child Vu score was down to be.  Had remained absent from alcohol that time.  Reduced swelling distension along with decreased swelling in lower extremities.       Patient seen today, his recent MELD is 5, Child Vu class a of labs done in March 2022.  His EGD was also performed in March 2022 that showed a normal esophagus, stomach and duodenum.  He still needs an updated abdominal ultrasound.  His last imaging performed was a CT of the abdomen done October 2021 that  showed stigmata of cirrhosis with portal hypertension diffuse body wall edema and bilateral pleural effusions as well.  He needs refills on his folate, B12, lactulose.     He denies any melena, hematochezia, hematemesis.  Denies any jaundice or abdominal swelling or lower extremity swelling.     8/7/23:Continues to abstain from alcohol.  Most updated abdominal ultrasound August 2022 revealing only hepatic steatosis, no masses or lesions present.  Off diuretics at this point.  Denies any melena, hematochezia, jaundice.    No complaints, recently saw his new PCP 2 weeks ago, updated MELD labs at that time revealed MELD 9, Child Vu class a.  HCC screening performed 2/9/2 3 reveals no hepatic lesions.  Denies any melena, hematochezia, jaundice, hematemesis.     2/19/24:  No complaints today, had metabolic panel done a month ago needs INR today to complete full course of MELD labs.  Up-to-date on HCC screening performed last week, revealed no evidence of hepatic lesions.  Endorsing 1 bowel movement per day, soft in nature.  Denies any melena, hematochezia, jaundice, hematemesis and no other hospital stays.  Still smokes half a pack per day of cigarettes.     8/19/24:  No complaints today, did miss his HCC screening that was scheduled this month however is already being rescheduled for next month.  Still smoking half a pack of cigarettes per day but recently started on Wellbutrin a combative help with this issue, however he states since taking Wellbutrin has been having night sweats.  Endorses 1 bowel movement per day without straining.  Denies melena hematochezia jaundice hematemesis.  No alcohol since 2021.  Last EGD March 22 2 was normal in the ED/S/D we will be due for updated EGD.  MELD labs up-to-date.  Colon cancer screening ordered by PCP still needs to be performed.    Today's visit:  No complaints today, he is slated for both EGD and colonoscopy in 2 days on 2/26, his Cologuard was collected is outpatient  appointment with his PCP and found to be positive on 02/05/25.  Denies any melena or hematochezia.  Denies any weight loss, or night sweats.  Endorsing 1 bowel movement per day without straining.  Still smoking half a pack of cigarettes per day, denies any abdominal swelling lower extremity swelling scleral icterus or jaundice.  Refusing influenza vaccine today.  Review of Systems   Constitutional: Negative.    HENT: Negative.     Eyes: Negative.    Respiratory: Negative.     Cardiovascular: Negative.    Gastrointestinal: Negative.    Endocrine: Negative.    Genitourinary: Negative.    Musculoskeletal: Negative.    Allergic/Immunologic: Negative.    Neurological: Negative.    Hematological: Negative.    Psychiatric/Behavioral: Negative.          Objective   Vitals:    02/24/25 1400   BP: 130/85   Pulse: 82   Resp: 16   Temp: 98.4 °F (36.9 °C)         Physical Exam  Constitutional:       Appearance: Normal appearance. He is normal weight.   HENT:      Head: Normocephalic and atraumatic.   Eyes:      Conjunctiva/sclera: Conjunctivae normal.      Pupils: Pupils are equal, round, and reactive to light.   Cardiovascular:      Rate and Rhythm: Normal rate and regular rhythm.      Pulses: Normal pulses.      Heart sounds: Normal heart sounds.   Pulmonary:      Effort: Pulmonary effort is normal.      Breath sounds: Normal breath sounds.   Abdominal:      General: Abdomen is flat. Bowel sounds are normal.      Palpations: Abdomen is soft.   Skin:     General: Skin is warm and dry.   Neurological:      General: No focal deficit present.      Mental Status: He is alert and oriented to person, place, and time. Mental status is at baseline.        Assessment and Plan     1. Alcoholic cirrhosis of liver without ascites  -     Comprehensive Metabolic Panel  -     Protime-INR  -     US Abdomen Limited_Liver; Future; Expected date: 03/10/2025        Background:  Alcohol:  Yes, in the past     Tobacco:  Cigarettes, 1/2 ppd     Liver  disease:  Alcohol     MELD-Na:  6, needs to be updated today  Child-Vu Class: 5A     Transplant: not under evaluation, low MELD     Cirrhosis is decompensated with:     Ascites:  In the past yes, in his October 2021 admission went been on Lasix 40 mg Aldactone 100 mg previously; paracentesis performed October 2021 revealed SANDRO G1 0.5, total protein less than 0.8, suggestive of portal hypertension.  Spontaneous bacterial peritonitis: No  Hepatic Encephalopathy: No  Hepatocellular carcinoma: No  Hepatorenal syndrome: No  Hyponatremia: No  Muscle wasting: No  Portal vein thrombosis: No     Screening:  Last EGD:  March 2022:  Normal esophagus, stomach, duodenum.        Last imaging study:  Abdominal ultrasound 02/14/2024: No hepatic lesions seen.  Otherwise unremarkable     CIRRHOSIS COUNSELING:  - strict abstinence of alcohol use  - low sodium (salt) 2000mg per day  - Nutrition: 25-30kcal (calorie per body weight in kilogram) per day  - No need to restrict protein in diet  - High protein diet: 1.2-1.5 gram/kg (protein per body weight in kg) per day to prevent muscle mass loss  - Resistance exercises for muscle strength  - Avoid raw seafoods due to risk of fatal Vibrio vulnificus infection  - Avoid Non-steroidal anti-inflammatory drugs (NSAIDs) such as ibuprofen, Motrin, naproxen, Aleve due to risk of kidney damage  - Can take acetaminophen (tylenol), no more than 2000mg per day  - Compliance with all medications  - Ultrasound or MRI of the liver every 6 months for liver cancer screening  - Upper endoscopy every 1-2 years to screen for varices       Followup in 6 months, EGD and colonoscopy to be performed on 02/26/2025, will follow-up on those results when they returned, MELD labs to be updated today in the office, HCC screening we will need to be updated within the next 2 weeks as he is well overdue he no showed his last appointment with Radiology.    Appreciate assistance of his PCP in obtaining the  Dee.  Refusing influenza vaccine today.    Labs returned.  MELD 7, Child Vu 5A.    Follow up in about 6 months (around 8/24/2025).

## 2025-02-25 ENCOUNTER — ANESTHESIA EVENT (OUTPATIENT)
Dept: ENDOSCOPY | Facility: HOSPITAL | Age: 45
End: 2025-02-25
Payer: MEDICAID

## 2025-02-25 NOTE — ANESTHESIA PREPROCEDURE EVALUATION
"                                                                                                             02/25/2025  Dex Ma is a 44 y.o., male  with PMHx of smoking, ETOH abuse/cirrhosis, GERD presents for screening EGD and colonoscopy    Metoprolol--2/26/25 @ 0940    Active Ambulatory Problems     Diagnosis Date Noted    Rosacea 05/12/2023    Alcoholic cirrhosis 07/26/2023    Attention deficit hyperactivity disorder (ADHD), combined type 07/26/2023     Resolved Ambulatory Problems     Diagnosis Date Noted    No Resolved Ambulatory Problems     Past Medical History:   Diagnosis Date    Cirrhosis     GERD (gastroesophageal reflux disease)        Pre-op Assessment    I have reviewed the NPO Status.      Review of Systems  Anesthesia Hx:  No problems with previous Anesthesia                Social:  Smoker       Cardiovascular:  Cardiovascular Normal                                              Pulmonary:  Pulmonary Normal                       Renal/:  Renal/ Normal                 Hepatic/GI:  Bowel Prep.   GERD, well controlled Liver Disease,               Neurological:  Neurology Normal                                      Endocrine:  Endocrine Normal              Vitals:    02/26/25 0926 02/26/25 0928   BP: (!) 133/98    BP Location: Left arm    Pulse: 80 80   Resp: 18    Temp: 36.5 °C (97.7 °F)    TempSrc: Oral    SpO2: 100%    Weight: 70.8 kg (156 lb)    Height: 5' 9" (1.753 m)          Physical Exam  General: Alert, Cooperative and Well nourished    Airway:  Mallampati: II   Mouth Opening: Normal  TM Distance: Normal  Tongue: Normal  Neck ROM: Normal ROM    Dental:  Intact    Chest/Lungs:  Clear to auscultation, Normal Respiratory Rate    Heart:  Rate: Normal  Rhythm: Regular Rhythm  Sounds: Normal      Lab Results   Component Value Date    WBC 5.73 07/22/2024    HGB 16.7 07/22/2024    HCT 47.4 07/22/2024    MCV 93.1 07/22/2024     07/22/2024       CMP  Sodium   Date Value Ref Range " Status   02/24/2025 142 136 - 145 mmol/L Final     Potassium   Date Value Ref Range Status   02/24/2025 3.8 3.5 - 5.1 mmol/L Final     Chloride   Date Value Ref Range Status   02/24/2025 104 98 - 107 mmol/L Final     CO2   Date Value Ref Range Status   02/24/2025 28 22 - 29 mmol/L Final     Blood Urea Nitrogen   Date Value Ref Range Status   02/24/2025 12.7 8.9 - 20.6 mg/dL Final     Creatinine   Date Value Ref Range Status   02/24/2025 1.09 0.72 - 1.25 mg/dL Final     Calcium   Date Value Ref Range Status   02/24/2025 9.6 8.4 - 10.2 mg/dL Final     Albumin   Date Value Ref Range Status   02/24/2025 4.4 3.5 - 5.0 g/dL Final     Bilirubin Total   Date Value Ref Range Status   02/24/2025 0.6 <=1.5 mg/dL Final     ALP   Date Value Ref Range Status   02/24/2025 54 40 - 150 unit/L Final     AST   Date Value Ref Range Status   02/24/2025 19 5 - 34 unit/L Final     ALT   Date Value Ref Range Status   02/24/2025 17 0 - 55 unit/L Final     eGFR   Date Value Ref Range Status   02/24/2025 >60 mL/min/1.73/m2 Final     Comment:     Estimated GFR calculated using the CKD-EPI creatinine (2021) equation.     Lab Results   Component Value Date    INR 1.0 02/24/2025    INR 1.0 07/22/2024    INR 1.0 02/19/2024         Anesthesia Plan  Type of Anesthesia, risks & benefits discussed:    Anesthesia Type: Gen Natural Airway  Intra-op Monitoring Plan: Standard ASA Monitors  Post Op Pain Control Plan: IV/PO Opioids PRN  Induction:  IV  Informed Consent: Informed consent signed with the Patient and all parties understand the risks and agree with anesthesia plan.  All questions answered.   ASA Score: 3  Day of Surgery Review of History & Physical: H&P Update referred to the surgeon/provider.    Ready For Surgery From Anesthesia Perspective.     .

## 2025-02-26 ENCOUNTER — HOSPITAL ENCOUNTER (OUTPATIENT)
Facility: HOSPITAL | Age: 45
Discharge: HOME | End: 2025-02-26
Attending: INTERNAL MEDICINE | Admitting: INTERNAL MEDICINE
Payer: MEDICAID

## 2025-02-26 ENCOUNTER — ANESTHESIA (OUTPATIENT)
Dept: ENDOSCOPY | Facility: HOSPITAL | Age: 45
End: 2025-02-26
Payer: MEDICAID

## 2025-02-26 DIAGNOSIS — K70.30 ALCOHOLIC CIRRHOSIS OF LIVER WITHOUT ASCITES: ICD-10-CM

## 2025-02-26 DIAGNOSIS — R19.5 POSITIVE COLORECTAL CANCER SCREENING USING COLOGUARD TEST: ICD-10-CM

## 2025-02-26 PROCEDURE — 25000003 PHARM REV CODE 250: Performed by: INTERNAL MEDICINE

## 2025-02-26 PROCEDURE — 88305 TISSUE EXAM BY PATHOLOGIST: CPT | Mod: TC,91 | Performed by: INTERNAL MEDICINE

## 2025-02-26 PROCEDURE — 37000009 HC ANESTHESIA EA ADD 15 MINS: Performed by: INTERNAL MEDICINE

## 2025-02-26 PROCEDURE — 45385 COLONOSCOPY W/LESION REMOVAL: CPT | Performed by: INTERNAL MEDICINE

## 2025-02-26 PROCEDURE — 43235 EGD DIAGNOSTIC BRUSH WASH: CPT | Performed by: INTERNAL MEDICINE

## 2025-02-26 PROCEDURE — 63600175 PHARM REV CODE 636 W HCPCS: Performed by: NURSE ANESTHETIST, CERTIFIED REGISTERED

## 2025-02-26 PROCEDURE — 37000008 HC ANESTHESIA 1ST 15 MINUTES: Performed by: INTERNAL MEDICINE

## 2025-02-26 PROCEDURE — D9220A PRA ANESTHESIA: Mod: ,,, | Performed by: NURSE ANESTHETIST, CERTIFIED REGISTERED

## 2025-02-26 PROCEDURE — 27201423 OPTIME MED/SURG SUP & DEVICES STERILE SUPPLY: Performed by: INTERNAL MEDICINE

## 2025-02-26 RX ORDER — METOPROLOL SUCCINATE 50 MG/1
50 TABLET, EXTENDED RELEASE ORAL ONCE
Status: COMPLETED | OUTPATIENT
Start: 2025-02-26 | End: 2025-02-26

## 2025-02-26 RX ORDER — PROPOFOL 10 MG/ML
INJECTION, EMULSION INTRAVENOUS
Status: DISCONTINUED | OUTPATIENT
Start: 2025-02-26 | End: 2025-02-26

## 2025-02-26 RX ORDER — LIDOCAINE HYDROCHLORIDE 20 MG/ML
INJECTION INTRAVENOUS
Status: DISCONTINUED | OUTPATIENT
Start: 2025-02-26 | End: 2025-02-26

## 2025-02-26 RX ADMIN — PROPOFOL 50 MG: 10 INJECTION, EMULSION INTRAVENOUS at 10:02

## 2025-02-26 RX ADMIN — LIDOCAINE HYDROCHLORIDE 50 MG: 20 INJECTION INTRAVENOUS at 10:02

## 2025-02-26 RX ADMIN — PROPOFOL 120 MCG/KG/MIN: 10 INJECTION, EMULSION INTRAVENOUS at 10:02

## 2025-02-26 RX ADMIN — METOPROLOL SUCCINATE 50 MG: 50 TABLET, EXTENDED RELEASE ORAL at 09:02

## 2025-02-26 NOTE — PLAN OF CARE
Patient more awake. Tolerating sips of water. Call bell in reach. Dr. Lomas in room giving results to patient.

## 2025-02-26 NOTE — H&P
EGD and Colonoscopy History and Physical    Patient Name: Dex Ma  MRN: [id number]  : 1980  Date of Procedure:  2025  Referring Physician: Yair Hernandez MD  Primary Physician: Wil Harrington DO  Procedure Physician: Arely Lomas MD, MPH     Procedure - EGD and Colonoscopy  ASA - per anesthesia  Mallampati - per anesthesia  History of Anesthesia problems - no  Family history Anesthesia problems -  no   Plan of anesthesia - General    Diagnosis: variceal screening, positive cologuard  Chief Complaint: Same as above    HPI: Patient is an 44 y.o. male is here for the above.     Mr. Ma is a 44-year-old male with alcoholic cirrhosis here for an EGD.      He was hospitalized in 2021 with ascites.  Previously used to drink 2 pt of liquor a day up until a few months prior to this prior admission when he had decreased his intake.  Prior cocaine and marijuana use.  But has not used drugs in if years.  Gastroenterology evaluated patient at that time as MELD was 29, Child Vu class C.  Required a paracentesis on that admission that showed SAAG 1.5, total protein less than 0.8 suggesting  portal hypertension.  Of hyponatremic on admission and diuretics were held at that time and required albumin after his paracentesis.  He was placed on Bactrim once daily for SBP prophylaxis.  EGD was not done inpatient.       With sobriety, his MELD downtrended with liver recovery.  Ascites resolved.  He has been tapered off most medications including diuretics.  He is not on a beta blocker.     He is doing well.  He denies any melena, hematochezia, hematemesis.  Denies any jaundice or abdominal swelling or lower extremity swelling.  HE has one formed stool daily.  He remains sober, no recent PETH.  MELD-Na: 7     EGD in 2022 that showed a normal esophagus, stomach and duodenum.        Last colonoscopy: none  Family history: denies  Anticoagulation: none    ROS:  Constitutional: No fevers,  chills, No weight loss  CV: No chest pain  Pulm: No cough, No shortness of breath  GI: see HPI    Medical History:   Past Medical History:   Diagnosis Date    Cirrhosis     GERD (gastroesophageal reflux disease)          Surgical History:   Past Surgical History:   Procedure Laterality Date    ADENOIDECTOMY      DENTAL SURGERY      TONSILLECTOMY      UPPER GASTROINTESTINAL ENDOSCOPY         Family History:   Family History   Problem Relation Name Age of Onset    No Known Problems Mother      No Known Problems Father     .    Social History:   Social History     Socioeconomic History    Marital status: Single   Occupational History    Occupation:    Tobacco Use    Smoking status: Every Day     Current packs/day: 0.50     Average packs/day: 0.5 packs/day for 15.0 years (7.5 ttl pk-yrs)     Types: Cigarettes, Vaping with nicotine    Smokeless tobacco: Never    Tobacco comments:     States smoked 10 cigarettes a day   Substance and Sexual Activity    Alcohol use: Not Currently     Comment: past use    Drug use: Not Currently     Types: Marijuana     Comment: socially    Sexual activity: Yes     Partners: Female     Birth control/protection: Condom     Social Drivers of Health     Financial Resource Strain: Low Risk  (7/19/2024)    Overall Financial Resource Strain (CARDIA)     Difficulty of Paying Living Expenses: Not hard at all   Food Insecurity: No Food Insecurity (7/19/2024)    Hunger Vital Sign     Worried About Running Out of Food in the Last Year: Never true     Ran Out of Food in the Last Year: Never true   Transportation Needs: No Transportation Needs (9/21/2022)    PRAPARE - Transportation     Lack of Transportation (Medical): No     Lack of Transportation (Non-Medical): No   Physical Activity: Inactive (7/19/2024)    Exercise Vital Sign     Days of Exercise per Week: 0 days     Minutes of Exercise per Session: 0 min   Stress: Stress Concern Present (7/19/2024)    [employer] [place] [employer] - Occupational Stress Questionnaire     Feeling of Stress : To some extent   Housing Stability: Low Risk  (9/21/2022)    Housing Stability Vital Sign     Unable to Pay for Housing in the Last Year: No     Number of Places Lived in the Last Year: 1     Unstable Housing in the Last Year: No       Review of patient's allergies indicates:  No Known Allergies    Medications:   Prescriptions Prior to Admission[1]      Physical Exam:    Vital Signs:   Vitals:    02/26/25 0940   BP: (!) 133/98   Pulse: 80   Resp:    Temp:      BP (!) 133/98   Pulse 80   Temp 97.7 °F (36.5 °C) (Oral)   Resp 18   Ht 5' 9" (1.753 m)   Wt 70.8 kg (156 lb)   SpO2 100%   BMI 23.04 kg/m²     General:          Well appearing in no acute distress  Lungs: Clear to auscultation bilaterally, respirations unlabored  Heart: Regular rate and rhythm, S1 and S2 normal, no obvious murmurs  Abdomen:         Soft, non-tender, bowel sounds normal, no masses, no organomegaly        Labs:  Lab Results   Component Value Date    WBC 5.73 07/22/2024    HGB 16.7 07/22/2024    HCT 47.4 07/22/2024    MCV 93.1 07/22/2024     07/22/2024     Lab Results   Component Value Date    INR 1.0 02/24/2025    APTT 29.0 07/22/2024     Lab Results   Component Value Date     02/24/2025    K 3.8 02/24/2025    CO2 28 02/24/2025    BUN 12.7 02/24/2025    CREATININE 1.09 02/24/2025    LABPROT 8.0 02/24/2025    LABPROT 13.7 02/24/2025    ALBUMIN 4.4 02/24/2025    BILITOT 0.6 02/24/2025    ALKPHOS 54 02/24/2025    ALT 17 02/24/2025    AST 19 02/24/2025       Assessment and Plan:     History reviewed, vital signs satisfactory, cardiopulmonary status satisfactory.  I have explained the sedation options, risks, benefits, and alternatives of this endoscopic procedure to the patient including but not limited to bleeding, inflammation, infection, perforation, and death.  All questions were answered and the patient consented to proceed with procedure as planned.   The " patient is deemed an appropriate candidate for the sedation as planned.      Arely Lomas MD, MPH   of Clinical Medicine  Gastroenterology and Hepatology  [facility]    2/26/2025  9:58 AM          [1]   Medications Prior to Admission   Medication Sig Dispense Refill Last Dose/Taking    buPROPion (WELLBUTRIN SR) 150 MG TBSR 12 hr tablet Take 150 mg by mouth 2 (two) times daily. (Patient not taking: Reported on 2/24/2025)   Taking    buPROPion (WELLBUTRIN) 100 MG tablet Take 100 mg by mouth 2 (two) times daily.   2/25/2025    dextroamphetamine-amphetamine (ADDERALL) 20 mg tablet Take 1 tablet by mouth 2 (two) times daily.   2/25/2025    dextroamphetamine-amphetamine 10 mg Tab Take 1 tablet by mouth 2 (two) times daily. (Patient not taking: Reported on 2/24/2025)   Taking    ergocalciferol (ERGOCALCIFEROL) 50,000 unit Cap Take by mouth. (Patient not taking: Reported on 7/22/2024)       EUCERIN PLUS INTENSIVE REPAIR Crea Apply to affected area twice daily (Patient not taking: Reported on 2/24/2025)       folic acid (FOLVITE) 1 MG tablet See Instructions, TAKE 1 TABLET BY MOUTH DAILY, # 30 tab(s), 3 Refill(s), [facility]acy: [facility] #[id number], 177, cm, Height/Length Dosing, 01/24/22 15:49:00 CST, 71.1, kg, Weight Dosing, 01/24/22 15:49:00 CST (Patient not taking: Reported on 2/24/2025) 90 tablet 3     furosemide (LASIX) 20 MG tablet   See Instructions, TAKE 2 TABLETS BY MOUTH DAILY, # 60 tab(s), 3 Refill(s), Pharmacy: [facility] #[id number], 177, cm, Height/Length Dosing, 01/24/22 15:49:00 CST, 71.1, kg, Weight Dosing, 01/24/22 15:49:00 CST (Patient not taking: Reported on 2/24/2025)       gabapentin (NEURONTIN) 300 MG capsule Take 600 mg by mouth 2 (two) times daily. (Patient not taking: Reported on 2/24/2025)       metoprolol succinate (TOPROL-XL) 50 MG 24 hr tablet Take 50 mg by mouth every morning. (Patient not taking: Reported on 7/22/2024)        mineral oil-hydrophil petrolat (AQUAPHOR) Oint Apply topically as needed. With tacrolimus (Patient not taking: Reported on 2/24/2025) 454 g 0     pantoprazole (PROTONIX) 40 MG tablet Take 40 mg by mouth as needed. (Patient not taking: Reported on 2/24/2025)       polyethylene glycol (MOVIPREP) 100-7.5-2.691 gram solution Take as directed prior to colonoscopy 1 kit 0     predniSONE (DELTASONE) 20 MG tablet Take 1 tablet (20 mg total) by mouth 2 (two) times daily. (Patient not taking: Reported on 2/24/2025) 10 tablet 0     spironolactone (ALDACTONE) 25 MG tablet   See Instructions, TAKE 4 TABLETS BY MOUTH DAILY, # 120 tab(s), 3 Refill(s), Pharmacy: [facility] #[id number], 177, cm, Height/Length Dosing, 01/24/22 15:49:00 CST, 71.1, kg, Weight Dosing, 01/24/22 15:49:00 CST (Patient not taking: Reported on 2/24/2025)       tacrolimus (PROTOPIC) 0.1 % ointment Apply topically 2 (two) times daily. (Patient not taking: Reported on 2/24/2025) 30 g 1     vitamin B complex (VITAMIN B-100 COMPLEX ORAL) TAKE 1 TABLET BY MOUTH ONCE DAILY (Patient not taking: Reported on 2/24/2025)       VITAMIN B-1 100 MG tablet Take 1 tablet (100 mg total) by mouth once daily. (Patient not taking: Reported on 2/24/2025) 90 tablet 3     vitamin D (VITAMIN D3) 1000 units Tab Take 1,000 Units by mouth once daily. (Patient not taking: Reported on 2/24/2025)

## 2025-02-26 NOTE — ANESTHESIA POSTPROCEDURE EVALUATION
Anesthesia Post Evaluation    Patient: Dex Ma    Procedure(s) Performed: Procedure(s) (LRB):  EGD (ESOPHAGOGASTRODUODENOSCOPY) (N/A)  COLONOSCOPY (N/A)    Final Anesthesia Type: general      Patient location during evaluation: GI PACU  Patient participation: Yes- Able to Participate  Level of consciousness: awake and alert  Post-procedure vital signs: reviewed and stable  Pain management: adequate  Airway patency: patent    PONV status at discharge: No PONV  Anesthetic complications: no      Cardiovascular status: blood pressure returned to baseline  Respiratory status: unassisted  Hydration status: euvolemic  Follow-up not needed.              Vitals Value Taken Time   /98 02/26/25 09:40   Temp 36.5 °C (97.7 °F) 02/26/25 09:26   Pulse 80 02/26/25 09:40   Resp 18 02/26/25 09:26   SpO2 100 % 02/26/25 09:26         No case tracking events are documented in the log.      Pain/Emilio Score: No data recorded

## 2025-02-26 NOTE — TRANSFER OF CARE
"Anesthesia Transfer of Care Note    Patient: Dex Ma    Procedure(s) Performed: Procedure(s) (LRB):  EGD (ESOPHAGOGASTRODUODENOSCOPY) (N/A)  COLONOSCOPY (N/A)    Patient location: GI    Anesthesia Type: general    Transport from OR: Transported from OR on room air with adequate spontaneous ventilation    Post pain: adequate analgesia    Post assessment: no apparent anesthetic complications    Post vital signs: stable    Level of consciousness: awake    Nausea/Vomiting: no nausea/vomiting    Complications: none    Transfer of care protocol was followed      Last vitals: Visit Vitals  BP (!) 133/98   Pulse 80   Temp 36.5 °C (97.7 °F) (Oral)   Resp 18   Ht 5' 9" (1.753 m)   Wt 70.8 kg (156 lb)   SpO2 100%   BMI 23.04 kg/m²     "

## 2025-02-26 NOTE — PROVATION PATIENT INSTRUCTIONS
Discharge Summary/Instructions after an Endoscopic Procedure  Patient Name: Dex Ma  Patient MRN: [id number]  Patient YOB: 1980  Wednesday, February 26, 2025  Arely Lomas MD  Dear patient,  As a result of recent federal legislation (The Federal Cures Act), you may   receive lab or pathology results from your procedure in your [facility]   account before your physician is able to contact you. Your physician or   their representative will relay the results to you with their   recommendations at their soonest availability.  Thank you,  RESTRICTIONS:  During your procedure today, you received medications for sedation.  These   medications may affect your judgment, balance and coordination.  Therefore,   for 24 hours, you have the following restrictions:   - DO NOT drive a car, operate machinery, make legal/financial decisions,   sign important papers or drink alcohol.    ACTIVITY:  Today: no heavy lifting, straining or running due to procedural   sedation/anesthesia.  The following day: return to full activity including work.  DIET:  Eat and drink normally unless instructed otherwise.     TREATMENT FOR COMMON SIDE EFFECTS:  - Mild abdominal pain, nausea, belching, bloating or excessive gas:  rest,   eat lightly and use a heating pad.  - Sore Throat: treat with throat lozenges and/or gargle with warm salt   water.  - Because air was used during the procedure, expelling large amounts of air   from your rectum or belching is normal.  - If a bowel prep was taken, you may not have a bowel movement for 1-3 days.    This is normal.  SYMPTOMS TO WATCH FOR AND REPORT TO YOUR PHYSICIAN:  1. Abdominal pain or bloating, other than gas cramps.  2. Chest pain.  3. Back pain.  4. Signs of infection such as: chills or fever occurring within 24 hours   after the procedure.  5. Rectal bleeding, which would show as bright red, maroon, or black stools.   (A tablespoon of blood from the rectum is not serious,  especially if   hemorrhoids are present.)  6. Vomiting.  7. Weakness or dizziness.  GO DIRECTLY TO THE NEAREST EMERGENCY ROOM IF YOU HAVE ANY OF THE FOLLOWING:      Difficulty breathing              Chills and/or fever over 101 F   Persistent vomiting and/or vomiting blood   Severe abdominal pain   Severe chest pain   Black, tarry stools   Bleeding- more than one tablespoon   Any other symptom or condition that you feel may need urgent attention  Your doctor recommends these additional instructions:  If any biopsies were taken, your doctors clinic will contact you in 1 to 2   weeks with any results.  Recommendations:  - Patient has a contact number available for emergencies.  The signs and   symptoms of potential delayed complications were discussed with the   patient.  Return to normal activities tomorrow.  Written discharge   instructions were provided to the patient.   - Discharge patient to home.   - Resume previous diet.   - Continue present medications.   - Await pathology results.   - Repeat colonoscopy in 7 years for surveillance.  Impressions:  - The examined portion of the ileum was normal.   - One 2 mm polyp in the cecum, removed with a cold snare.  Resected and   retrieved.   - One 6 mm polyp in the descending colon, removed with a cold snare.    Resected and retrieved.   - External and internal hemorrhoids.  For questions, problems or results please call your physician - Arely Lomas MD at Work:  [phone], Work:  [phone].  [facility] , EMERGENCY ROOM PHONE NUMBER: [phone]  IF A COMPLICATION OR EMERGENCY SITUATION ARISES AND YOU ARE UNABLE TO REACH   YOUR PHYSICIAN - GO DIRECTLY TO THE EMERGENCY ROOM.  Arely Lomas MD  2/26/2025 11:25:25 AM  This report has been verified and signed electronically.  Dear patient,  As a result of recent federal legislation (The Federal Cures Act), you may   receive lab or pathology results from your procedure in your  [facility]   account before your physician is able to contact you. Your physician or   their representative will relay the results to you with their   recommendations at their soonest availability.  Thank you,  PROVATION

## 2025-02-26 NOTE — PLAN OF CARE
Patient resting in bed. Awake but drowsy. Answers all questions appropriately. Family at bedside.

## 2025-02-26 NOTE — PROVATION PATIENT INSTRUCTIONS
Discharge Summary/Instructions after an Endoscopic Procedure  Patient Name: Dex Ma  Patient MRN: [id number]  Patient YOB: 1980  Wednesday, February 26, 2025  Arely Lomas MD  Dear patient,  As a result of recent federal legislation (The Federal Cures Act), you may   receive lab or pathology results from your procedure in your [facility]   account before your physician is able to contact you. Your physician or   their representative will relay the results to you with their   recommendations at their soonest availability.  Thank you,  RESTRICTIONS:  During your procedure today, you received medications for sedation.  These   medications may affect your judgment, balance and coordination.  Therefore,   for 24 hours, you have the following restrictions:   - DO NOT drive a car, operate machinery, make legal/financial decisions,   sign important papers or drink alcohol.    ACTIVITY:  Today: no heavy lifting, straining or running due to procedural   sedation/anesthesia.  The following day: return to full activity including work.  DIET:  Eat and drink normally unless instructed otherwise.     TREATMENT FOR COMMON SIDE EFFECTS:  - Mild abdominal pain, nausea, belching, bloating or excessive gas:  rest,   eat lightly and use a heating pad.  - Sore Throat: treat with throat lozenges and/or gargle with warm salt   water.  - Because air was used during the procedure, expelling large amounts of air   from your rectum or belching is normal.  - If a bowel prep was taken, you may not have a bowel movement for 1-3 days.    This is normal.  SYMPTOMS TO WATCH FOR AND REPORT TO YOUR PHYSICIAN:  1. Abdominal pain or bloating, other than gas cramps.  2. Chest pain.  3. Back pain.  4. Signs of infection such as: chills or fever occurring within 24 hours   after the procedure.  5. Rectal bleeding, which would show as bright red, maroon, or black stools.   (A tablespoon of blood from the rectum is not serious,  especially if   hemorrhoids are present.)  6. Vomiting.  7. Weakness or dizziness.  GO DIRECTLY TO THE NEAREST EMERGENCY ROOM IF YOU HAVE ANY OF THE FOLLOWING:      Difficulty breathing              Chills and/or fever over 101 F   Persistent vomiting and/or vomiting blood   Severe abdominal pain   Severe chest pain   Black, tarry stools   Bleeding- more than one tablespoon   Any other symptom or condition that you feel may need urgent attention  Your doctor recommends these additional instructions:  If any biopsies were taken, your doctors clinic will contact you in 1 to 2   weeks with any results.  Recommendations:  - Patient has a contact number available for emergencies.  The signs and   symptoms of potential delayed complications were discussed with the   patient.  Return to normal activities tomorrow.  Written discharge   instructions were provided to the patient.   - Discharge patient to home.   - Low sodium diet.   - Continue present medications.   - Repeat upper endoscopy in 2 years for surveillance.  Impressions:  - Normal esophagus.   - Esophagogastric landmarks identified.   - Normal stomach.   - Normal examined duodenum.   - No specimens collected.  For questions, problems or results please call your physician - Arely Lomas MD at Work:  [phone], Work:  [phone].  [facility] , EMERGENCY ROOM PHONE NUMBER: [phone]  IF A COMPLICATION OR EMERGENCY SITUATION ARISES AND YOU ARE UNABLE TO REACH   YOUR PHYSICIAN - GO DIRECTLY TO THE EMERGENCY ROOM.  Arely Lomas MD  2/26/2025 11:26:27 AM  This report has been verified and signed electronically.  Dear patient,  As a result of recent federal legislation (The Federal Cures Act), you may   receive lab or pathology results from your procedure in your [facility]   account before your physician is able to contact you. Your physician or   their representative will relay the results to you with their    recommendations at their soonest availability.  Thank you,  PROVATION

## 2025-02-27 VITALS
RESPIRATION RATE: 18 BRPM | HEIGHT: 69 IN | WEIGHT: 156 LBS | TEMPERATURE: 98 F | BODY MASS INDEX: 23.11 KG/M2 | SYSTOLIC BLOOD PRESSURE: 129 MMHG | OXYGEN SATURATION: 100 % | HEART RATE: 89 BPM | DIASTOLIC BLOOD PRESSURE: 99 MMHG

## 2025-02-28 LAB
ESTROGEN SERPL-MCNC: NORMAL PG/ML
INSULIN SERPL-ACNC: NORMAL U[IU]/ML
LAB AP CLINICAL INFORMATION: NORMAL
LAB AP GROSS DESCRIPTION: NORMAL
LAB AP REPORT FOOTNOTES: NORMAL
T3RU NFR SERPL: NORMAL %

## 2025-03-11 ENCOUNTER — RESULTS FOLLOW-UP (OUTPATIENT)
Dept: GASTROENTEROLOGY | Facility: CLINIC | Age: 45
End: 2025-03-11

## 2025-05-28 ENCOUNTER — OFFICE VISIT (OUTPATIENT)
Dept: INTERNAL MEDICINE | Facility: CLINIC | Age: 45
End: 2025-05-28
Payer: MEDICAID

## 2025-05-28 VITALS
TEMPERATURE: 98 F | OXYGEN SATURATION: 98 % | DIASTOLIC BLOOD PRESSURE: 78 MMHG | SYSTOLIC BLOOD PRESSURE: 114 MMHG | WEIGHT: 157.38 LBS | BODY MASS INDEX: 23.31 KG/M2 | RESPIRATION RATE: 18 BRPM | HEART RATE: 70 BPM | HEIGHT: 69 IN

## 2025-05-28 DIAGNOSIS — F10.21 HISTORY OF ALCOHOLISM: ICD-10-CM

## 2025-05-28 DIAGNOSIS — K70.30 ALCOHOLIC CIRRHOSIS OF LIVER WITHOUT ASCITES: ICD-10-CM

## 2025-05-28 PROCEDURE — 99213 OFFICE O/P EST LOW 20 MIN: CPT | Mod: PBBFAC

## 2025-05-28 PROCEDURE — 90715 TDAP VACCINE 7 YRS/> IM: CPT | Mod: PBBFAC

## 2025-05-28 PROCEDURE — 90471 IMMUNIZATION ADMIN: CPT | Mod: PBBFAC

## 2025-05-28 RX ORDER — FOLIC ACID 1 MG/1
TABLET ORAL
Qty: 90 TABLET | Refills: 3 | Status: SHIPPED | OUTPATIENT
Start: 2025-05-28

## 2025-05-28 RX ORDER — THIAMINE HCL 100 MG
100 TABLET ORAL DAILY
Qty: 90 TABLET | Refills: 3 | Status: SHIPPED | OUTPATIENT
Start: 2025-05-28

## 2025-05-28 RX ORDER — TACROLIMUS 1 MG/G
OINTMENT TOPICAL 2 TIMES DAILY
Qty: 30 G | Refills: 1 | Status: SHIPPED | OUTPATIENT
Start: 2025-05-28

## 2025-05-28 RX ORDER — ERGOCALCIFEROL 1.25 MG/1
50000 CAPSULE ORAL
COMMUNITY
Start: 2025-03-22

## 2025-05-28 RX ADMIN — CLOSTRIDIUM TETANI TOXOID ANTIGEN (FORMALDEHYDE INACTIVATED), CORYNEBACTERIUM DIPHTHERIAE TOXOID ANTIGEN (FORMALDEHYDE INACTIVATED), BORDETELLA PERTUSSIS TOXOID ANTIGEN (GLUTARALDEHYDE INACTIVATED), BORDETELLA PERTUSSIS FILAMENTOUS HEMAGGLUTININ ANTIGEN (FORMALDEHYDE INACTIVATED), BORDETELLA PERTUSSIS PERTACTIN ANTIGEN, AND BORDETELLA PERTUSSIS FIMBRIAE 2/3 ANTIGEN 0.5 ML: 5; 2; 2.5; 5; 3; 5 INJECTION, SUSPENSION INTRAMUSCULAR at 08:05

## 2025-05-28 NOTE — PROGRESS NOTES
INTERNAL MEDICINE RESIDENT  CLINIC NOTE    Patient Name: eDx Ma  YOB: 1980  Chief Complaint: Follow-up (No c/o voiced at this time)     PRESENTING HISTORY   History of Present Illness:  Mr. Dex Ma is a 45 y.o. male w/ PMHx of alcoholic cirrhosis and GERD who presents to clinic today for routine follow-up.     Patient reports no acute complaints today and states that he is feeling well overall.  He reports continued abstinence from alcohol. Reports compliance with prescribed medications outside of Wellbutrin. He states that he feels well off of this medication.  Patient has upcoming scheduled appointment with Dr. Hernandez in cirrhosis. Needs updated US liver, he missed previously scheduled. Recently underwent EGD and colonoscopy.     Review of Systems:  12 point review of symptoms negative unless otherwise stated above    PAST HISTORY:     Past Medical History:   Diagnosis Date    Cirrhosis     GERD (gastroesophageal reflux disease)         Past Surgical History:   Procedure Laterality Date    ADENOIDECTOMY      COLONOSCOPY, WITH POLYPECTOMY USING SNARE N/A 2/26/2025    Procedure: COLONOSCOPY, WITH POLYPECTOMY USING SNARE;  Surgeon: Arely Lomas MD;  Location: [facility] ENDOSCOPY;  Service: Gastroenterology;  Laterality: N/A;    DENTAL SURGERY      ESOPHAGOGASTRODUODENOSCOPY N/A 2/26/2025    Procedure: EGD (ESOPHAGOGASTRODUODENOSCOPY);  Surgeon: Arely Lomas MD;  Location: [facility] ENDOSCOPY;  Service: Gastroenterology;  Laterality: N/A;    TONSILLECTOMY      UPPER GASTROINTESTINAL ENDOSCOPY         Family History   Problem Relation Name Age of Onset    No Known Problems Mother      Diabetes Father         Social History     Socioeconomic History    Marital status: Single   Occupational History    Occupation:    Tobacco Use    Smoking status: Every Day     Current packs/day: 0.50     Average packs/day: 0.5 packs/day for 15.0 years (7.5 ttl pk-yrs)     Types:  Cigarettes, Vaping with nicotine    Smokeless tobacco: Never    Tobacco comments:     States currently using [email or web address]   Substance and Sexual Activity    Alcohol use: Not Currently     Comment: past use    Drug use: Not Currently     Types: Marijuana     Comment: socially    Sexual activity: Yes     Partners: Female     Birth control/protection: Condom     Social Drivers of Health     Financial Resource Strain: Low Risk  (3/4/2025)    Overall Financial Resource Strain (CARDIA)     Difficulty of Paying Living Expenses: Not very hard   Food Insecurity: No Food Insecurity (3/4/2025)    Hunger Vital Sign     Worried About Running Out of Food in the Last Year: Never true     Ran Out of Food in the Last Year: Never true   Transportation Needs: No Transportation Needs (3/4/2025)    PRAPARE - Transportation     Lack of Transportation (Medical): No     Lack of Transportation (Non-Medical): No   Physical Activity: Inactive (3/4/2025)    Exercise Vital Sign     Days of Exercise per Week: 0 days     Minutes of Exercise per Session: 0 min   Stress: No Stress Concern Present (3/4/2025)    Citizen of [place] of Occupational Health - Occupational Stress Questionnaire     Feeling of Stress : Only a little   Housing Stability: Low Risk  (5/28/2025)    Housing Stability Vital Sign     Unable to Pay for Housing in the Last Year: No     Number of Times Moved in the Last Year: 1     Homeless in the Last Year: No       MEDICATIONS:     Current Outpatient Medications   Medication Instructions    dextroamphetamine-amphetamine (ADDERALL) 20 mg tablet 1 tablet, 2 times daily    EUCERIN PLUS INTENSIVE REPAIR Crea Apply to affected area twice daily    folic acid (FOLVITE) 1 MG tablet   See Instructions, TAKE 1 TABLET BY MOUTH DAILY, # 30 tab(s), 3 Refill(s), [id number], 177, cm, Height/Length Dosing, 01/24/22 15:49:00 CST, 71.1, kg, Weight Dosing, 01/24/22 15:49:00 CST    tacrolimus (PROTOPIC) 0.1 % ointment Topical  "(Top), 2 times daily    VITAMIN B-1 100 mg, Oral, Daily    VITAMIN D2 50,000 Units, Twice weekly        OBJECTIVE:   Vital Signs:  Vitals:    05/28/25 0826   BP: 114/78   Pulse: 70   Resp: 18   Temp: 97.7 °F (36.5 °C)   TempSrc: Oral   SpO2: 98%   Weight: 71.4 kg (157 lb 6.4 oz)   Height: 5' 9" (1.753 m)       Physical Exam  Vitals reviewed.   Constitutional:       Appearance: Normal appearance.   Eyes:      General: No scleral icterus.     Extraocular Movements: Extraocular movements intact.   Cardiovascular:      Rate and Rhythm: Normal rate and regular rhythm.      Pulses: Normal pulses.      Heart sounds: No murmur heard.     No friction rub. No gallop.   Pulmonary:      Breath sounds: Normal breath sounds. No wheezing, rhonchi or rales.   Abdominal:      General: There is no distension.      Palpations: Abdomen is soft. There is no mass.      Tenderness: There is no abdominal tenderness.   Musculoskeletal:         General: No swelling or tenderness.   Skin:     General: Skin is warm and dry.   Neurological:      General: No focal deficit present.      Mental Status: He is alert.   Psychiatric:         Mood and Affect: Mood normal.         Behavior: Behavior normal.       Laboratory  Lab Results   Component Value Date     02/24/2025    K 3.8 02/24/2025     02/24/2025    CO2 28 02/24/2025    GLU 85 02/24/2025    BUN 12.7 02/24/2025    CREATININE 1.09 02/24/2025    CALCIUM 9.6 02/24/2025    PROT 8.0 02/24/2025    BILIDIR 3.7 (H) 11/15/2021    IBILI 1.70 (H) 11/15/2021    ALKPHOS 54 02/24/2025    AST 19 02/24/2025    ALT 17 02/24/2025    MG 2.00 10/27/2021    PHOS 2.4 10/29/2021        Lab Results   Component Value Date    WBC 5.73 07/22/2024    RBC 5.09 07/22/2024    HGB 16.7 07/22/2024    HCT 47.4 07/22/2024    MCV 93.1 07/22/2024    MCH 32.8 (H) 07/22/2024    MCHC 35.2 07/22/2024    RDW 12.4 07/22/2024     07/22/2024    MPV 10.7 (H) 07/22/2024        Diagnostic Results:  No results found in " the last 30 days.   No results found in the last 24 hours.     ASSESSMENT & PLAN:     Alcoholic cirrhosis of liver without ascites   -Diagnosed during hospital admission on 10/2021   -Patient reports continued EtOH abstinence.   -Patient does not report any recent hospitalizations or symptoms.  -MELD-Na at presentation on 10/2021 was 29; Child-Vu at presentation was Class C. Most recently (2/24/25) MELD 7, Child Vu 5A  -Patient is to continue to abstain from alcohol use and avoid excessive use of hepatotoxic medication  -Continue to follow cirrhosis clinic with Dr. Hernandez.   -RUQ US conducted on 08/14/2023 displayed no acute abnormalities; repeat ordered. Missed appointment on 3/10/25.  -EGD on 2/26/2025 with no significant abnormalities. Continue with q 2 year screening.      Rosacea of face  -Patient was seen by Dermatology on 5/12/23  -Was prescribed Prednisone 40mg x5d and topical tacrolimus with instructions for Aquaphor BID after steroid course completion.  -Improved     ADHD:   -Patient is following Psychiatrist for management.  -Currently taking Adderall and continues to deny side-effects.      Health Maintenance/ Wellness  There is no immunization history on file for this patient.     Immunizations: Patient declined vaccines at today's visit  Colon cancer screening: Cologaurd positive on 2/5/25. Underwent diagnostic colonoscopy on 2/26/2025. Repeat in 7 years for surveillance.     Counseling:  - Patient counselled on smoking cessation  - Educated on diet (portion control) and exercise (at least 30 minutes per day)  - Relevant educational materials provided    Health Maintenance/ Wellness  Immunization History   Administered Date(s) Administered    Tdap 05/28/2025       Medications: reconciled, discussed and refills given.  Follow up in about 8 months (around 1/28/2026).    Wil Harrington DO  05/28/2025

## 2025-05-28 NOTE — PROGRESS NOTES
I have reviewed and concur with the resident's history, physical, assessment, and plan.  I have discussed with him all issues related to the diagnosis, workup and treatment plan. Care provided as reasonable and necessary.Alcoholic cirrhosis..stable. MELD 7 Child Vu 5A    Jere Sofia MD  [facility]

## 2025-06-03 ENCOUNTER — HOSPITAL ENCOUNTER (OUTPATIENT)
Dept: RADIOLOGY | Facility: HOSPITAL | Age: 45
Discharge: HOME | End: 2025-06-03
Payer: MEDICAID

## 2025-06-03 DIAGNOSIS — K70.30 ALCOHOLIC CIRRHOSIS OF LIVER WITHOUT ASCITES: ICD-10-CM

## 2025-06-03 PROCEDURE — 76705 ECHO EXAM OF ABDOMEN: CPT | Mod: TC

## 2025-07-06 DIAGNOSIS — F10.21 HISTORY OF ALCOHOLISM: ICD-10-CM

## 2025-07-07 RX ORDER — THIAMINE HCL 100 MG
100 TABLET ORAL DAILY
Qty: 90 TABLET | Refills: 3 | OUTPATIENT
Start: 2025-07-07

## 2025-08-14 ENCOUNTER — LAB VISIT (OUTPATIENT)
Dept: LAB | Facility: HOSPITAL | Age: 45
End: 2025-08-14
Payer: MEDICAID

## 2025-08-14 DIAGNOSIS — K70.30 ALCOHOLIC CIRRHOSIS OF LIVER WITHOUT ASCITES: ICD-10-CM

## 2025-08-14 LAB
25(OH)D3+25(OH)D2 SERPL-MCNC: 21 NG/ML (ref 30–80)
ALBUMIN SERPL-MCNC: 4 G/DL (ref 3.5–5)
ALBUMIN/GLOB SERPL: 1 RATIO (ref 1.1–2)
ALP SERPL-CCNC: 57 UNIT/L (ref 40–150)
ALT SERPL-CCNC: 20 UNIT/L (ref 0–55)
ANION GAP SERPL CALC-SCNC: 11 MEQ/L
APTT PPP: 28.8 SECONDS (ref 23.2–33.7)
AST SERPL-CCNC: 20 UNIT/L (ref 11–45)
BASOPHILS # BLD AUTO: 0.03 X10(3)/MCL
BASOPHILS NFR BLD AUTO: 0.5 %
BILIRUB SERPL-MCNC: 0.7 MG/DL
BUN SERPL-MCNC: 15.7 MG/DL (ref 8.9–20.6)
CALCIUM SERPL-MCNC: 10 MG/DL (ref 8.4–10.2)
CHLORIDE SERPL-SCNC: 104 MMOL/L (ref 98–107)
CHOLEST SERPL-MCNC: 180 MG/DL
CHOLEST/HDLC SERPL: 4 {RATIO} (ref 0–5)
CO2 SERPL-SCNC: 28 MMOL/L (ref 22–29)
CREAT SERPL-MCNC: 1.09 MG/DL (ref 0.72–1.25)
CREAT/UREA NIT SERPL: 14
EOSINOPHIL # BLD AUTO: 0.14 X10(3)/MCL (ref 0–0.9)
EOSINOPHIL NFR BLD AUTO: 2.4 %
ERYTHROCYTE [DISTWIDTH] IN BLOOD BY AUTOMATED COUNT: 12.6 % (ref 11.5–17)
EST. AVERAGE GLUCOSE BLD GHB EST-MCNC: 91.1 MG/DL
FOLATE SERPL-MCNC: 14.2 NG/ML (ref 7–31.4)
GFR SERPLBLD CREATININE-BSD FMLA CKD-EPI: >60 ML/MIN/1.73/M2
GLOBULIN SER-MCNC: 3.9 GM/DL (ref 2.4–3.5)
GLUCOSE SERPL-MCNC: 97 MG/DL (ref 74–100)
HBA1C MFR BLD: 4.8 %
HCT VFR BLD AUTO: 46 % (ref 42–52)
HDLC SERPL-MCNC: 50 MG/DL (ref 35–60)
HGB BLD-MCNC: 16.2 G/DL (ref 14–18)
IMM GRANULOCYTES # BLD AUTO: 0.01 X10(3)/MCL (ref 0–0.04)
IMM GRANULOCYTES NFR BLD AUTO: 0.2 %
INR PPP: 1
LDLC SERPL CALC-MCNC: 105 MG/DL (ref 50–140)
LYMPHOCYTES # BLD AUTO: 1.97 X10(3)/MCL (ref 0.6–4.6)
LYMPHOCYTES NFR BLD AUTO: 33.2 %
MCH RBC QN AUTO: 32.2 PG (ref 27–31)
MCHC RBC AUTO-ENTMCNC: 35.2 G/DL (ref 33–36)
MCV RBC AUTO: 91.5 FL (ref 80–94)
MONOCYTES # BLD AUTO: 0.42 X10(3)/MCL (ref 0.1–1.3)
MONOCYTES NFR BLD AUTO: 7.1 %
NEUTROPHILS # BLD AUTO: 3.36 X10(3)/MCL (ref 2.1–9.2)
NEUTROPHILS NFR BLD AUTO: 56.6 %
NRBC BLD AUTO-RTO: 0 %
PLATELET # BLD AUTO: 194 X10(3)/MCL (ref 130–400)
PMV BLD AUTO: 10.6 FL (ref 7.4–10.4)
POTASSIUM SERPL-SCNC: 4.5 MMOL/L (ref 3.5–5.1)
PROT SERPL-MCNC: 7.9 GM/DL (ref 6.4–8.3)
PROTHROMBIN TIME: 12.9 SECONDS (ref 11.4–14)
RBC # BLD AUTO: 5.03 X10(6)/MCL (ref 4.7–6.1)
SODIUM SERPL-SCNC: 143 MMOL/L (ref 136–145)
TRIGL SERPL-MCNC: 124 MG/DL (ref 34–140)
VIT B12 SERPL-MCNC: 959 PG/ML (ref 213–816)
VLDLC SERPL CALC-MCNC: 25 MG/DL
WBC # BLD AUTO: 5.93 X10(3)/MCL (ref 4.5–11.5)

## 2025-08-14 PROCEDURE — 85730 THROMBOPLASTIN TIME PARTIAL: CPT

## 2025-08-14 PROCEDURE — 80053 COMPREHEN METABOLIC PANEL: CPT

## 2025-08-14 PROCEDURE — 85610 PROTHROMBIN TIME: CPT

## 2025-08-14 PROCEDURE — 82607 VITAMIN B-12: CPT

## 2025-08-14 PROCEDURE — 82306 VITAMIN D 25 HYDROXY: CPT

## 2025-08-14 PROCEDURE — 80061 LIPID PANEL: CPT

## 2025-08-14 PROCEDURE — 82746 ASSAY OF FOLIC ACID SERUM: CPT

## 2025-08-14 PROCEDURE — 36415 COLL VENOUS BLD VENIPUNCTURE: CPT

## 2025-08-14 PROCEDURE — 85025 COMPLETE CBC W/AUTO DIFF WBC: CPT

## 2025-08-14 PROCEDURE — 83036 HEMOGLOBIN GLYCOSYLATED A1C: CPT

## 2025-08-25 ENCOUNTER — OFFICE VISIT (OUTPATIENT)
Dept: GASTROENTEROLOGY | Facility: CLINIC | Age: 45
End: 2025-08-25
Payer: MEDICAID

## 2025-08-25 VITALS
HEART RATE: 96 BPM | HEIGHT: 69 IN | BODY MASS INDEX: 23.01 KG/M2 | DIASTOLIC BLOOD PRESSURE: 88 MMHG | SYSTOLIC BLOOD PRESSURE: 134 MMHG | RESPIRATION RATE: 12 BRPM | TEMPERATURE: 98 F | WEIGHT: 155.38 LBS

## 2025-08-25 DIAGNOSIS — K70.30 ALCOHOLIC CIRRHOSIS OF LIVER WITHOUT ASCITES: Primary | ICD-10-CM

## 2025-08-25 PROCEDURE — 99213 OFFICE O/P EST LOW 20 MIN: CPT | Mod: PBBFAC | Performed by: STUDENT IN AN ORGANIZED HEALTH CARE EDUCATION/TRAINING PROGRAM

## (undated) DEVICE — MANIFOLD 4 PORT

## (undated) DEVICE — KIT SURGICAL COLON .25 1.1OZ

## (undated) DEVICE — SNARE EXACTO COLD

## (undated) DEVICE — TRAP ETRAP POLYP 50 TRAY

## (undated) DEVICE — MOUTHPIECE ENDO 60FR